# Patient Record
Sex: FEMALE | Race: WHITE | NOT HISPANIC OR LATINO | Employment: PART TIME | URBAN - METROPOLITAN AREA
[De-identification: names, ages, dates, MRNs, and addresses within clinical notes are randomized per-mention and may not be internally consistent; named-entity substitution may affect disease eponyms.]

---

## 2017-01-06 ENCOUNTER — HOSPITAL ENCOUNTER (EMERGENCY)
Facility: CLINIC | Age: 22
Discharge: HOME | End: 2017-01-07
Attending: EMERGENCY MEDICINE | Admitting: EMERGENCY MEDICINE
Payer: COMMERCIAL

## 2017-01-06 DIAGNOSIS — J11.1 INFLUENZA-LIKE ILLNESS: ICD-10-CM

## 2017-01-06 PROCEDURE — 87804 INFLUENZA ASSAY W/OPTIC: CPT | Performed by: EMERGENCY MEDICINE

## 2017-01-06 PROCEDURE — 99283 EMERGENCY DEPT VISIT LOW MDM: CPT | Mod: 25

## 2017-01-06 PROCEDURE — 99284 EMERGENCY DEPT VISIT MOD MDM: CPT | Performed by: EMERGENCY MEDICINE

## 2017-01-06 NOTE — ED AVS SNAPSHOT
[facility] Emergency Department    [address] [address]    Phone:  [phone]    Fax:  [phone]                                       Matteo Barth   MRN: [id number]    Department:  [facility] Emergency Department   Date of Visit:  1/6/2017           After Visit Summary Signature Page     I have received my discharge instructions, and my questions have been answered. I have discussed any challenges I see with this plan with the nurse or doctor.    ..........................................................................................................................................  Patient/Patient Representative Signature      ..........................................................................................................................................  Patient Representative Print Name and Relationship to Patient    ..................................................               ................................................  Date                                            Time    ..........................................................................................................................................  Reviewed by Signature/Title    ...................................................              ..............................................  Date                                                            Time

## 2017-01-06 NOTE — ED AVS SNAPSHOT
[facility] Emergency Department    [address] [address]    Phone:  [phone]    Fax:  [phone]                                       Matteo Barth   MRN: [id number]    Department:  [facility] Emergency Department   Date of Visit:  1/6/2017           Patient Information     Date Of Birth          1995        Your diagnoses for this visit were:     Influenza-like illness        You were seen by Maximus Moody MD.        Discharge Instructions       Drink plenty of fluids.  Use acetaminophen and ibuprofen for symptoms.  Return to the emergency department if worsening symptoms, repeated vomiting, or other concerns.  Otherwise follow up in primary care.    24 Hour Appointment Hotline       To make an appointment at any [facility] clinic, call [phone] ([phone]). If you don't have a family doctor or clinic, we will help you find one. [facility] clinics are conveniently located to serve the needs of you and your family.             Review of your medicines      CONTINUE these medicines which may have CHANGED, or have new prescriptions. If we are uncertain of the size of tablets/capsules you have at home, strength may be listed as something that might have changed.        Dose / Directions Last dose taken    albuterol 108 (90 BASE) MCG/ACT Inhaler   Commonly known as:  albuterol   Dose:  2 puff   What changed:    - when to take this  - reasons to take this   Quantity:  1 Inhaler        Inhale 2 puffs into the lungs every 4 hours as needed for shortness of breath / dyspnea   Refills:  0          Our records show that you are taking the medicines listed below. If these are incorrect, please call your family doctor or clinic.        Dose / Directions Last dose taken    triamcinolone 0.1 % cream   Commonly known as:  KENALOG   Quantity:  454 g        Apply to AA BID x 1-2 weeks then PRN   Refills:  1                Prescriptions were sent or printed at these locations (1  "Prescription)                   [facility] [address] [place], [place] [address] [address] [place] [address]    Telephone:  [phone]   Fax:  [phone]   Hours:                  E-Prescribed (1 of 1)         albuterol (ALBUTEROL) 108 (90 BASE) MCG/ACT Inhaler                Procedures and tests performed during your visit     Domingo avilezert DNA pcr    Influenza A/B antigen      Orders Needing Specimen Collection     None      Pending Results     No orders found for last 2 day(s).            Pending Culture Results     No orders found for last 2 day(s).       Test Results from your hospital stay           1/7/2017 12:46 AM - Interface, [facility] Results      Component Results     Component Value Ref Range & Units Status    Influenza A/B Agn Specimen   Corrected    Nasal  CORRECTED ON 01/07 AT 0046: PREVIOUSLY REPORTED AS 30      Influenza A  NEG Final    Negative   Test results must be correlated with clinical data. If necessary, results   should be confirmed by a molecular assay or viral culture.      Influenza B  NEG Final    Negative   Test results must be correlated with clinical data. If necessary, results   should be confirmed by a molecular assay or viral culture.                  Thank you for choosing [facility]       Thank you for choosing [facility] for your care. Our goal is always to provide you with excellent care. Hearing back from our patients is one way we can continue to improve our services. Please take a few minutes to complete the written survey that you may receive in the mail after you visit with us. Thank you!        [employer] Information     [employer] lets you send messages to your doctor, view your test results, renew your prescriptions, schedule appointments and more. To sign up, go to [email or web address] . Click on \"Log in\" on the left side of the screen, which will take you to the Welcome page. Then click on \"Sign up Now\" on the right side " of the page.     You will be asked to enter the access code listed below, as well as some personal information. Please follow the directions to create your username and password.     Your access code is: [email or web address]  Expires: 2017  1:00 AM     Your access code will  in 90 days. If you need help or a new code, please call your [facility] clinic or [phone].        Care EveryWhere ID     This is your Care EveryWhere ID. This could be used by other organizations to access your [facility] medical records  [id number]        After Visit Summary       This is your record. Keep this with you and show to your community pharmacist(s) and doctor(s) at your next visit.

## 2017-01-07 VITALS
WEIGHT: 260 LBS | TEMPERATURE: 98.1 F | OXYGEN SATURATION: 97 % | RESPIRATION RATE: 16 BRPM | DIASTOLIC BLOOD PRESSURE: 88 MMHG | SYSTOLIC BLOOD PRESSURE: 139 MMHG | BODY MASS INDEX: 46.07 KG/M2 | HEART RATE: 75 BPM | HEIGHT: 63 IN

## 2017-01-07 LAB
FLUAV+FLUBV AG SPEC QL: NORMAL
FLUAV+FLUBV AG SPEC QL: NORMAL
SPECIMEN SOURCE: NORMAL

## 2017-01-07 PROCEDURE — 94640 AIRWAY INHALATION TREATMENT: CPT

## 2017-01-07 PROCEDURE — 87801 DETECT AGNT MULT DNA AMPLI: CPT | Performed by: EMERGENCY MEDICINE

## 2017-01-07 PROCEDURE — 25000125 ZZHC RX 250: Performed by: EMERGENCY MEDICINE

## 2017-01-07 PROCEDURE — 25000132 ZZH RX MED GY IP 250 OP 250 PS 637: Performed by: EMERGENCY MEDICINE

## 2017-01-07 RX ORDER — ALBUTEROL SULFATE 90 UG/1
2 AEROSOL, METERED RESPIRATORY (INHALATION) EVERY 4 HOURS PRN
Qty: 1 INHALER | Refills: 0 | Status: SHIPPED | OUTPATIENT
Start: 2017-01-07 | End: 2020-05-26

## 2017-01-07 RX ORDER — ALBUTEROL SULFATE 90 UG/1
2 AEROSOL, METERED RESPIRATORY (INHALATION) ONCE
Status: COMPLETED | OUTPATIENT
Start: 2017-01-07 | End: 2017-01-07

## 2017-01-07 RX ORDER — IBUPROFEN 400 MG/1
800 TABLET, FILM COATED ORAL ONCE
Status: COMPLETED | OUTPATIENT
Start: 2017-01-07 | End: 2017-01-07

## 2017-01-07 RX ORDER — DEXAMETHASONE SODIUM PHOSPHATE 4 MG/ML
10 VIAL (ML) INJECTION ONCE
Status: COMPLETED | OUTPATIENT
Start: 2017-01-07 | End: 2017-01-07

## 2017-01-07 RX ORDER — IPRATROPIUM BROMIDE AND ALBUTEROL SULFATE 2.5; .5 MG/3ML; MG/3ML
3 SOLUTION RESPIRATORY (INHALATION) ONCE
Status: COMPLETED | OUTPATIENT
Start: 2017-01-07 | End: 2017-01-07

## 2017-01-07 RX ADMIN — IPRATROPIUM BROMIDE AND ALBUTEROL SULFATE 3 ML: .5; 3 SOLUTION RESPIRATORY (INHALATION) at 00:30

## 2017-01-07 RX ADMIN — IBUPROFEN 800 MG: 400 TABLET ORAL at 00:28

## 2017-01-07 RX ADMIN — ALBUTEROL SULFATE 2 PUFF: 90 AEROSOL, METERED RESPIRATORY (INHALATION) at 01:11

## 2017-01-07 RX ADMIN — DEXAMETHASONE SODIUM PHOSPHATE 10 MG: 4 INJECTION, SOLUTION INTRAMUSCULAR; INTRAVENOUS at 01:10

## 2017-01-07 NOTE — DISCHARGE INSTRUCTIONS
Drink plenty of fluids.  Use acetaminophen and ibuprofen for symptoms.  Return to the emergency department if worsening symptoms, repeated vomiting, or other concerns.  Otherwise follow up in primary care.

## 2017-01-07 NOTE — ED NOTES
Pt c/o 2 days of cough, tightness when coughing. Patient denies fever - states was feeling worse at work today.

## 2017-01-07 NOTE — ED PROVIDER NOTES
"  History     Chief Complaint   Patient presents with     Cough     Pt c/o tight cough     HPI  Matteo Barth is a 21 year old female who presents for cough, body aches, chills, runny nose, sore throat.  Symptoms have been ongoing for 2 days.  Cough is productive of clear sputum.  Pain in her chest with coughing, none otherwise, feels like tightness, discomfort rated as mild.  She does describe paroxysms of coughing.  She has been using NyQuil, DayQuil, and acetaminophen for symptoms with minimal improvement.  She has been told she has asthma in the past, has not required any albuterol for several years.  She denies fever, headache, abdominal pain, vomiting, diarrhea, dysuria, or rash.  No young children at home.    Previously healthy  No daily medications  No known drug allergies  Current every day smoker, rare alcohol use, denies illicit drug use    I have reviewed the Medications, Allergies, Past Medical and Surgical History, and Social History in the Epic system.    Review of Systems  A 4 point review of systems was performed. All pertinent positives and negatives were listed in the HPI and rest of ROS were otherwise negative.    Physical Exam   BP: 139/77 mmHg  Pulse: 75  Temp: 98.1  F (36.7  C)  Resp: 16  Height: 160 cm (5' 3\")  Weight: 117.935 kg (260 lb)  SpO2: 98 %  Physical Exam   Constitutional: She is oriented to person, place, and time. She appears well-developed and well-nourished. She appears distressed.   HENT:   Head: Normocephalic and atraumatic.   Right Ear: Tympanic membrane and external ear normal.   Left Ear: Tympanic membrane and external ear normal.   Nose: Nose normal.   Mouth/Throat: No trismus in the jaw. No oropharyngeal exudate, posterior oropharyngeal edema, posterior oropharyngeal erythema or tonsillar abscesses.   Eyes: Conjunctivae are normal. No scleral icterus.   Neck: Normal range of motion.   Cardiovascular: Normal rate and regular rhythm.    Pulmonary/Chest: Effort normal. No " stridor. No respiratory distress. She has wheezes.   Abdominal: Soft. She exhibits no distension.   Neurological: She is alert and oriented to person, place, and time.   Skin: Skin is warm and dry. She is not diaphoretic.   Psychiatric: She has a normal mood and affect. Her behavior is normal.   Nursing note and vitals reviewed.      ED Course   Procedures             Critical Care time:  none               Labs Ordered and Resulted from Time of ED Arrival Up to the Time of Departure from the ED   JUNIORTALLA PERTUSSIS CHARLENE IGG WITH REFLEX   INFLUENZA A/B ANTIGEN       Assessments & Plan (with Medical Decision Making)   21-year-old female presents for cough and chest tightness.  Differential includes asthma exacerbation, pneumonia, bronchitis, influenza, pertussis.  The patient has diffuse wheezing on examination.  Lungs otherwise clear, temperature 98.1 F, heart rate 75, SPO2 98% on room air, unlikely pneumonia, no indication for chest x-ray at this time.  Influenza swab negative.  She is given a DuoNeb with improvement in her symptoms.  Pertussis swab obtained, results pending.  She is given a dose of oral dexamethasone and a short course of albuterol here as well as a prescription for albuterol inhaler for symptoms.  She is discharged with instructions to return if she has worsening symptoms or other concerns, otherwise follow up in clinic.  She is instructed to avoid contact with young children and to try to limit contact outside the home as she is feeling ill or until after the pertussis swab returns negative.  She is in agreement with this plan.    I have reviewed the nursing notes.    I have reviewed the findings, diagnosis, plan and need for follow up with the patient.    New Prescriptions    ALBUTEROL (ALBUTEROL) 108 (90 BASE) MCG/ACT INHALER    Inhale 2 puffs into the lungs every 4 hours as needed for shortness of breath / dyspnea       Final diagnoses:   Influenza-like illness       1/6/2017   [place]  [facility] EMERGENCY DEPARTMENT      Maximus Moody MD  01/07/17 0105

## 2017-01-09 LAB
B PERT+PARAPERT DNA PNL SPEC NAA+PROBE: NORMAL
SPECIMEN SOURCE: NORMAL

## 2017-04-19 ENCOUNTER — HOSPITAL ENCOUNTER (EMERGENCY)
Facility: CLINIC | Age: 22
Discharge: HOME | End: 2017-04-19
Attending: PHYSICIAN ASSISTANT | Admitting: PHYSICIAN ASSISTANT
Payer: COMMERCIAL

## 2017-04-19 VITALS
HEART RATE: 106 BPM | DIASTOLIC BLOOD PRESSURE: 89 MMHG | TEMPERATURE: 97.5 F | SYSTOLIC BLOOD PRESSURE: 148 MMHG | RESPIRATION RATE: 20 BRPM | OXYGEN SATURATION: 100 %

## 2017-04-19 DIAGNOSIS — M54.50 ACUTE MIDLINE LOW BACK PAIN WITHOUT SCIATICA: Primary | ICD-10-CM

## 2017-04-19 PROCEDURE — 99213 OFFICE O/P EST LOW 20 MIN: CPT | Mod: 25

## 2017-04-19 PROCEDURE — 25000128 H RX IP 250 OP 636

## 2017-04-19 PROCEDURE — 96372 THER/PROPH/DIAG INJ SC/IM: CPT

## 2017-04-19 PROCEDURE — 99213 OFFICE O/P EST LOW 20 MIN: CPT | Performed by: PHYSICIAN ASSISTANT

## 2017-04-19 RX ORDER — KETOROLAC TROMETHAMINE 30 MG/ML
60 INJECTION, SOLUTION INTRAMUSCULAR; INTRAVENOUS ONCE
Status: COMPLETED | OUTPATIENT
Start: 2017-04-19 | End: 2017-04-19

## 2017-04-19 RX ORDER — KETOROLAC TROMETHAMINE 30 MG/ML
INJECTION, SOLUTION INTRAMUSCULAR; INTRAVENOUS
Status: COMPLETED
Start: 2017-04-19 | End: 2017-04-19

## 2017-04-19 RX ADMIN — KETOROLAC TROMETHAMINE 60 MG: 30 INJECTION, SOLUTION INTRAMUSCULAR; INTRAVENOUS at 16:04

## 2017-04-19 RX ADMIN — KETOROLAC TROMETHAMINE 60 MG: 30 INJECTION, SOLUTION INTRAMUSCULAR at 16:04

## 2017-04-19 ASSESSMENT — ENCOUNTER SYMPTOMS
CONSTITUTIONAL NEGATIVE: 1
GASTROINTESTINAL NEGATIVE: 1
BACK PAIN: 1
NEUROLOGICAL NEGATIVE: 1

## 2017-04-19 NOTE — ED AVS SNAPSHOT
[facility] Emergency Department    [address] [address]    Phone:  [phone]    Fax:  [phone]                                       Matteo Barth   MRN: [id number]    Department:  [facility] Emergency Department   Date of Visit:  4/19/2017           After Visit Summary Signature Page     I have received my discharge instructions, and my questions have been answered. I have discussed any challenges I see with this plan with the nurse or doctor.    ..........................................................................................................................................  Patient/Patient Representative Signature      ..........................................................................................................................................  Patient Representative Print Name and Relationship to Patient    ..................................................               ................................................  Date                                            Time    ..........................................................................................................................................  Reviewed by Signature/Title    ...................................................              ..............................................  Date                                                            Time

## 2017-04-19 NOTE — ED AVS SNAPSHOT
[facility] Emergency Department    [address] [facility] [address]    Phone:  [phone]    Fax:  [phone]                                       Matteo Barth   MRN: [id number]    Department:  [facility] Emergency Department   Date of Visit:  4/19/2017           Patient Information     Date Of Birth          1995        Your diagnoses for this visit were:     Acute midline low back pain without sciatica        You were seen by Roxanne Madrigal PA-C.      Follow-up Information     Follow up with [facility].    Specialty:  Physical Therapy    Why:  For follow-up    Contact information:    [address]  [address]  [facility] [address] [phone]    Additional information:    The medical center is located at   [address] ([address] in [place], four miles north   of [place]).        Follow up with [facility] Emergency Department.    Specialty:  EMERGENCY MEDICINE    Why:  As needed, For persistent symptoms    Contact information:    [address] [facility] [address] [facility] [address] [phone]    Additional information:    The medical center is located at   [address] (between [address]   [address], four miles north   of [place]).      Discharge References/Attachments     BACK PAIN, RELIEVING (ENGLISH)    BACK SPASM, NO TRAUMA (ENGLISH)      24 Hour Appointment Hotline       To make an appointment at any [facility], call [phone] ([phone]). If you don't have a family doctor or clinic, we will help you find one. [facility] clinics are conveniently located to serve the needs of you and your family.             Review of your medicines      Our records show that you are taking the medicines listed below. If these are incorrect, please call your family doctor or clinic.        Dose / Directions Last dose taken    albuterol 108 (90 BASE) MCG/ACT Inhaler   Commonly known as:   "albuterol   Dose:  2 puff   Quantity:  1 Inhaler        Inhale 2 puffs into the lungs every 4 hours as needed for shortness of breath / dyspnea   Refills:  0        triamcinolone 0.1 % cream   Commonly known as:  KENALOG   Quantity:  454 g        Apply to AA BID x 1-2 weeks then PRN   Refills:  1                Orders Needing Specimen Collection     None      Pending Results     No orders found from 2017 to 2017.            Pending Culture Results     No orders found from 2017 to 2017.            Test Results From Your Hospital Stay               Thank you for choosing [facility]       Thank you for choosing [facility] for your care. Our goal is always to provide you with excellent care. Hearing back from our patients is one way we can continue to improve our services. Please take a few minutes to complete the written survey that you may receive in the mail after you visit with us. Thank you!        [employer] Information     [employer] lets you send messages to your doctor, view your test results, renew your prescriptions, schedule appointments and more. To sign up, go to [email or web address] . Click on \"Log in\" on the left side of the screen, which will take you to the Welcome page. Then click on \"Sign up Now\" on the right side of the page.     You will be asked to enter the access code listed below, as well as some personal information. Please follow the directions to create your username and password.     Your access code is: [email or web address]  Expires: 2017  4:07 PM     Your access code will  in 90 days. If you need help or a new code, please call your [facility] clinic or [phone].        Care EveryWhere ID     This is your Care EveryWhere ID. This could be used by other organizations to access your [facility] medical records  [id number]        After Visit Summary       This is your record. Keep this with you and show to your community pharmacist(s) and doctor(s) at your next visit.  "

## 2017-04-19 NOTE — LETTER
[facility] EMERGENCY DEPARTMENT  [address] [address]  [phone]    2017    Matteo Barth  [address] [address]  [phone] (home)     : 1995      To Whom it may concern:    Matteo Barth was seen in our Emergency Department today, 2017.  Please excuse from work tonight.  Thank you.      Sincerely,        Roxanne Madrigal

## 2017-04-19 NOTE — ED PROVIDER NOTES
History     Chief Complaint   Patient presents with     Back Pain     HPI  Matteo Barth is a 22 year old female who presents with complaints of midline low back pain for the past 2 months.  Denies hx back pain.  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  States her pain is worse with bending, twisting, and moving.  Pt describes worsening pain when she gets off of work after being on her feet all day and bending-over.  Denies saddle anesthesia, bowel or bladder incontinence, lower extremity numbness/tingling/weakness.  Gait is steady.  Denies fevers, chills, nausea, vomiting, abdominal pain, urinary symptoms, or leg pain/swelling.      I have reviewed the Medications, Allergies, Past Medical and Surgical History, and Social History in the Epic system.    Review of Systems   Constitutional: Negative.    Gastrointestinal: Negative.    Genitourinary: Negative.    Musculoskeletal: Positive for back pain.   Skin: Negative.    Neurological: Negative.    All other systems reviewed and are negative.      Physical Exam   BP: 148/89  Pulse: 106  Temp: 97.5  F (36.4  C)  Resp: 20  SpO2: 100 %  Physical Exam   Constitutional: She appears well-developed and well-nourished. No distress.   HENT:   Head: Normocephalic and atraumatic.   Cardiovascular: Normal rate, regular rhythm and normal heart sounds.    Pulmonary/Chest: Effort normal and breath sounds normal.   Abdominal: Soft. There is no tenderness.   Musculoskeletal: Normal range of motion.        Cervical back: Normal. She exhibits normal range of motion, no tenderness and no bony tenderness.        Thoracic back: Normal. She exhibits normal range of motion, no tenderness and no bony tenderness.        Lumbar back: She exhibits tenderness. She exhibits normal range of motion and no bony tenderness.   There is diffuse lumbar midline and paraspinal  muscle tenderness to palpation.     Neurological: She is alert. She has normal strength. No sensory deficit.   Reflex Scores:       Patellar reflexes are 2+ on the right side and 2+ on the left side.  Able to raise-up on toes without difficulties   Skin: Skin is warm and dry.       ED Course     ED Course     Procedures      Assessments & Plan (with Medical Decision Making)     DDx:  acute muscle strain, muscle spasm, herniated disc, cauda equina, spinal fracture, spinal stenosis, sciatica, degenerative disease, ligamentous injury, spondylolisthesis, epidural abscess, osteomyelitis, AAA, abdominal etiologies, nephrolithiasis, pyelonephritis     Pt is a 22 year old female who presents with complaints of midline low back pain for the past 2 months.  Denies hx back pain.  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  States her pain is worse with bending, twisting, and moving.  Pt describes worsening pain when she gets off of work after being on her feet all day and bending-over.  Denies saddle anesthesia, bowel or bladder incontinence, lower extremity numbness/tingling/weakness.  Gait is steady.  Denies fevers, chills, nausea, vomiting, abdominal pain, urinary symptoms, or leg pain/swelling.  Pt is afebrile on arrival.  There is diffuse lumbar midline and paraspinal muscle tenderness to palpation.  No evidence of cauda equina.  Denies saddle anesthesia, bowel or bladder incontinence, lower extremity numbness/tingling/weakness.  Normal lower extremity strength.  Gait is steady.  No indication for emergent MRI imaging today as pt has no new trauma or neurologic symptoms or objective findings of weakness or signs of cauda equina on exam.  Encouraged symptomatic treatments including NSAIDs at home.  Hand-outs were provided.    Patient was instructed to follow-up with physical therapy for continued  care and management or sooner if new or worsening symptoms.  She is to return to the ED for persistent and/or worsening symptoms.  Patient expressed understanding of the diagnosis and plan and was discharged home in good condition.    I have reviewed the nursing notes.    I have reviewed the findings, diagnosis, plan and need for follow up with the patient.    Discharge Medication List as of 4/19/2017  4:07 PM          Final diagnoses:   Acute midline low back pain without sciatica       4/19/2017   [facility] EMERGENCY DEPARTMENT     Roxanne Madrigal PA-C  04/19/17 1902

## 2017-11-27 ENCOUNTER — OFFICE VISIT (OUTPATIENT)
Dept: OBGYN | Facility: CLINIC | Age: 22
End: 2017-11-27
Payer: COMMERCIAL

## 2017-11-27 VITALS
SYSTOLIC BLOOD PRESSURE: 145 MMHG | BODY MASS INDEX: 48.83 KG/M2 | DIASTOLIC BLOOD PRESSURE: 78 MMHG | HEART RATE: 98 BPM | WEIGHT: 275.6 LBS | HEIGHT: 63 IN

## 2017-11-27 DIAGNOSIS — Z30.011 ENCOUNTER FOR INITIAL PRESCRIPTION OF CONTRACEPTIVE PILLS: ICD-10-CM

## 2017-11-27 DIAGNOSIS — Z30.432 ENCOUNTER FOR IUD REMOVAL: Primary | ICD-10-CM

## 2017-11-27 DIAGNOSIS — Z23 NEED FOR PROPHYLACTIC VACCINATION AND INOCULATION AGAINST INFLUENZA: ICD-10-CM

## 2017-11-27 DIAGNOSIS — Z01.419 ENCOUNTER FOR GYNECOLOGICAL EXAMINATION WITHOUT ABNORMAL FINDING: ICD-10-CM

## 2017-11-27 PROCEDURE — 90686 IIV4 VACC NO PRSV 0.5 ML IM: CPT | Performed by: OBSTETRICS & GYNECOLOGY

## 2017-11-27 PROCEDURE — 99201 ZZC OFFICE/OUTPT VISIT, NEW, LEVEL I: CPT | Mod: 25 | Performed by: OBSTETRICS & GYNECOLOGY

## 2017-11-27 PROCEDURE — G0145 SCR C/V CYTO,THINLAYER,RESCR: HCPCS | Performed by: OBSTETRICS & GYNECOLOGY

## 2017-11-27 PROCEDURE — 90471 IMMUNIZATION ADMIN: CPT | Performed by: OBSTETRICS & GYNECOLOGY

## 2017-11-27 PROCEDURE — 87491 CHLMYD TRACH DNA AMP PROBE: CPT | Performed by: OBSTETRICS & GYNECOLOGY

## 2017-11-27 PROCEDURE — 87591 N.GONORRHOEAE DNA AMP PROB: CPT | Performed by: OBSTETRICS & GYNECOLOGY

## 2017-11-27 PROCEDURE — 58301 REMOVE INTRAUTERINE DEVICE: CPT | Performed by: OBSTETRICS & GYNECOLOGY

## 2017-11-27 RX ORDER — NORETHINDRONE ACETATE AND ETHINYL ESTRADIOL .03; 1.5 MG/1; MG/1
1 TABLET ORAL DAILY
Qty: 63 TABLET | Refills: 3 | Status: SHIPPED | OUTPATIENT
Start: 2017-11-27 | End: 2020-05-26

## 2017-11-27 NOTE — PROGRESS NOTES

## 2017-11-27 NOTE — MR AVS SNAPSHOT
"              After Visit Summary   11/27/2017    Matteo Barth    MRN: [id number]           Patient Information     Date Of Birth          1995        Visit Information        Provider Department      11/27/2017 11:00 AM Otto Jorge DO [facility]        Today's Diagnoses     Encounter for IUD removal    -  1    Encounter for gynecological examination without abnormal finding        Encounter for initial prescription of contraceptive pills           Follow-ups after your visit        Follow-up notes from your care team     Return in about 3 months (around 2/27/2018).      Who to contact     If you have questions or need follow up information about today's clinic visit or your schedule please contact [facility] directly at [phone].  Normal or non-critical lab and imaging results will be communicated to you by MyChart, letter or phone within 4 business days after the clinic has received the results. If you do not hear from us within 7 days, please contact the clinic through [employer] or phone. If you have a critical or abnormal lab result, we will notify you by phone as soon as possible.  Submit refill requests through [employer] or call your pharmacy and they will forward the refill request to us. Please allow 3 business days for your refill to be completed.          Additional Information About Your Visit        MyChart Information     [employer] lets you send messages to your doctor, view your test results, renew your prescriptions, schedule appointments and more. To sign up, go to [email or web address] . Click on \"Log in\" on the left side of the screen, which will take you to the Welcome page. Then click on \"Sign up Now\" on the right side of the page.     You will be asked to enter the access code listed below, as well as some personal information. Please follow the directions to create your username and password.     Your access code is: [email or web address]  Expires: 2/25/2018 " "11:21 AM     Your access code will  in 90 days. If you need help or a new code, please call your [facility] clinic or [phone].        Care EveryWhere ID     This is your Care EveryWhere ID. This could be used by other organizations to access your [facility] medical records  [id number]        Your Vitals Were     Pulse Height BMI (Body Mass Index)             98 5' 3\" (1.6 m) 48.82 kg/m2          Blood Pressure from Last 3 Encounters:   17 145/78   17 148/89   17 139/88    Weight from Last 3 Encounters:   17 275 lb 9.6 oz (125 kg)   17 260 lb (117.9 kg)   16 291 lb (132 kg)              We Performed the Following     Chlamydia trachomatis PCR     Neisseria gonorrhoeae PCR     Pap imaged thin layer screen only - recommended age 21 - 24 years     REMOVE INTRAUTERINE DEVICE          Today's Medication Changes          These changes are accurate as of: 17 11:21 AM.  If you have any questions, ask your nurse or doctor.               Start taking these medicines.        Dose/Directions    norethindrone-ethinyl estradiol 1.5-30 MG-MCG per tablet   Commonly known as:  MICROGESTIN 1.5/30   Used for:  Encounter for initial prescription of contraceptive pills   Started by:  Otto Jorge,         Dose:  1 tablet   Take 1 tablet by mouth daily   Quantity:  63 tablet   Refills:  3            Where to get your medicines      These medications were sent to [facility] [address] [address]     Phone:  [phone]     norethindrone-ethinyl estradiol 1.5-30 MG-MCG per tablet                Primary Care Provider Office Phone # Fax #    [facility] [phone] [address] [address]        Equal Access to Services     CHRIS ROBLES AH: Ramos jewell Somulu, waaxda luqadaha, qaybta kaalmada adeegyada, danny cheney " ah. So [facility] [phone].    ATENCIÓN: Si daniellela glenn, tiene a king disposición servicios gratuitos de asistencia lingüística. Angel al [phone].    We comply with applicable federal civil rights laws and [place] laws. We do not discriminate on the basis of race, color, national origin, age, disability, sex, sexual orientation, or gender identity.            Thank you!     Thank you for choosing [facility]  for your care. Our goal is always to provide you with excellent care. Hearing back from our patients is one way we can continue to improve our services. Please take a few minutes to complete the written survey that you may receive in the mail after your visit with us. Thank you!             Your Updated Medication List - Protect others around you: Learn how to safely use, store and throw away your medicines at www.disposemymeds.org.          This list is accurate as of: 11/27/17 11:21 AM.  Always use your most recent med list.                   Brand Name Dispense Instructions for use Diagnosis    albuterol 108 (90 BASE) MCG/ACT Inhaler    PROAIR HFA    1 Inhaler    Inhale 2 puffs into the lungs every 4 hours as needed for shortness of breath / dyspnea        norethindrone-ethinyl estradiol 1.5-30 MG-MCG per tablet    MICROGESTIN 1.5/30    63 tablet    Take 1 tablet by mouth daily    Encounter for initial prescription of contraceptive pills       triamcinolone 0.1 % cream    KENALOG    454 g    Apply to AA BID x 1-2 weeks then PRN    Acute dermatitis

## 2017-11-27 NOTE — PROGRESS NOTES
PROCEDURE: IUD Removal   A timeout to verify the patient, procedure and site was conducted immediately prior to the start of the procedure. This procedure has been fully reviewed with the patient and written informed consent has been obtained.   Any potential IUD concern of the patient was addressed. After deliberation, she has opted for removal.   Indication for removal: Due for removal   Alternate form of contraception planned: oral contraceptives   A graves speculum is placed within the vagina and the cervix is well visualized. The IUD strings were seen and then grasped with a ring forcep. The IUD was then removed from the uterus without difficulty and intact.  Patient will be prescribed a low-dose combination OCP. She is morbidly obese, but beyond that does not have a history of thromboembolism or other absolute contraindications. I did help her understand that all women are at increased risk for DVT/thromboembolism when using an estrogen-containing contraceptive. Obese women may be at increased risk beyond this. There may also be a question whether there is a slight decrease in efficacy with significantly increased BMI. The chance of thromboembolism in pregnancy is much higher than a morbidly obese woman on OCPs and therefore pregnancy prevention alone decreases the risk of DVT in her particular population. Given that many morbidly obese women are also intermittently anovulatory/amenorrheic, the OCP has the additional benefit of continuing to provide her with endometrial protection compared to being on nothing.  Despite this, patient feels that the best option for her at this time.  Patient is due for a Pap smear and is agreeable to STD testing. Pap smear is obtained as is GC/chlamydia culture.  Combination Oral Contraceptive Informed Consent:   The patient has been counseled on the risks/benefit profile of combination oral contraceptives. She understands that benefits may include menstrual cycle control,  decreased dysmenorrhea, inhibition of ovarian cysts, contraception and decreased lifetime risk of uterine and ovarian cancer.   Risks/side-effects may include (but are not limited to) mood changes, weight gain, libido changes and thromboembolic risks which may culminate in DVT, pulmonary embolism, MI, stroke, and retinal vein thrombosis. She is aware that irregular bleeding is common, and to be expected. After 4-6 months, many women attain amenorrhea. Transient nausea may occur. She is counseled that smoking increases thromboembolic risks, and that if she is 35 years of age or older and continues to smoke, she would need to discontinue the combination oral contraceptive pill and consider another method of contraception.   Use:   She is instructed to take her pill at the same time every day and use back-up contraception if she has been noncompliant. Regardless, condoms are recommended as a backup method for the first month, and ongoing use encouraged to protect against STDs. Annual exam documentation is required for contraceptive refills.   When to contact us:   She should contact the office if she develops migraines, hypertension, unilateral leg pain or swelling, SOB, diabetes or severe mood changes/suicidal ideation; or similarly if diagnosed with a new chronic medical condition. Regular reassessment is optimal in order to determine safe candidacy for ongoing use.   She is able to voice understanding of all of the above.  Her OCP prescription is sent to her pharmacy.  I recommend a blood pressure check in about 3 months, and a reassessment of efficacy and compliance.    Otto Jorge DO

## 2017-11-27 NOTE — NURSING NOTE
"Chief Complaint   Patient presents with     IUD     Removal       Initial /78 (BP Location: Right arm, Patient Position: Chair, Cuff Size: Adult Large)  Pulse 98  Ht 5' 3\" (1.6 m)  Wt 275 lb 9.6 oz (125 kg)  BMI 48.82 kg/m2 Estimated body mass index is 48.82 kg/(m^2) as calculated from the following:    Height as of this encounter: 5' 3\" (1.6 m).    Weight as of this encounter: 275 lb 9.6 oz (125 kg).  Medication Reconciliation: complete     Cayla Alas LPN        "

## 2017-11-27 NOTE — LETTER
December 3, 2017      Matteo Barth  [address] [address]    Dear ,      I am happy to inform you that your recent cervical cancer screening test (PAP smear) was normal.      Preventative screenings such as this help to ensure your health for years to come. You should repeat a pap smear in 3 years, unless otherwise directed.      You will still need to return to the clinic every year for your annual exam and other preventive tests.     Please contact the clinic at [phone] if you have further questions.       Sincerely,      Otto Jorge, /elana

## 2017-11-28 LAB
C TRACH DNA SPEC QL NAA+PROBE: NEGATIVE
N GONORRHOEA DNA SPEC QL NAA+PROBE: NEGATIVE
SPECIMEN SOURCE: NORMAL
SPECIMEN SOURCE: NORMAL

## 2017-11-29 LAB
COPATH REPORT: NORMAL
PAP: NORMAL

## 2019-03-31 ENCOUNTER — APPOINTMENT (OUTPATIENT)
Dept: GENERAL RADIOLOGY | Facility: CLINIC | Age: 24
End: 2019-03-31
Attending: PHYSICIAN ASSISTANT
Payer: COMMERCIAL

## 2019-03-31 ENCOUNTER — HOSPITAL ENCOUNTER (EMERGENCY)
Facility: CLINIC | Age: 24
Discharge: HOME | End: 2019-03-31
Attending: PHYSICIAN ASSISTANT | Admitting: PHYSICIAN ASSISTANT
Payer: COMMERCIAL

## 2019-03-31 VITALS
OXYGEN SATURATION: 99 % | TEMPERATURE: 98.8 F | SYSTOLIC BLOOD PRESSURE: 138 MMHG | WEIGHT: 240 LBS | BODY MASS INDEX: 42.52 KG/M2 | HEIGHT: 63 IN | HEART RATE: 61 BPM | DIASTOLIC BLOOD PRESSURE: 82 MMHG | RESPIRATION RATE: 18 BRPM

## 2019-03-31 DIAGNOSIS — S93.401A SPRAIN OF RIGHT ANKLE, UNSPECIFIED LIGAMENT, INITIAL ENCOUNTER: ICD-10-CM

## 2019-03-31 PROCEDURE — G0463 HOSPITAL OUTPT CLINIC VISIT: HCPCS | Mod: 25 | Performed by: PHYSICIAN ASSISTANT

## 2019-03-31 PROCEDURE — 99214 OFFICE O/P EST MOD 30 MIN: CPT | Mod: Z6 | Performed by: PHYSICIAN ASSISTANT

## 2019-03-31 PROCEDURE — 73610 X-RAY EXAM OF ANKLE: CPT | Mod: RT

## 2019-03-31 PROCEDURE — 29515 APPLICATION SHORT LEG SPLINT: CPT | Mod: RT | Performed by: PHYSICIAN ASSISTANT

## 2019-03-31 PROCEDURE — 73630 X-RAY EXAM OF FOOT: CPT | Mod: RT

## 2019-03-31 ASSESSMENT — MIFFLIN-ST. JEOR: SCORE: 1812.76

## 2019-03-31 NOTE — ED AVS SNAPSHOT
[facility]  [address] [address]  Phone:  [phone]  Fax:  [phone]                                    Matteo Barth   MRN: [id number]    Department:  [facility]   Date of Visit:  3/31/2019           After Visit Summary Signature Page    I have received my discharge instructions, and my questions have been answered. I have discussed any challenges I see with this plan with the nurse or doctor.    ..........................................................................................................................................  Patient/Patient Representative Signature      ..........................................................................................................................................  Patient Representative Print Name and Relationship to Patient    ..................................................               ................................................  Date                                   Time    ..........................................................................................................................................  Reviewed by Signature/Title    ...................................................              ..............................................  Date                                               Time          22EPIC Rev 08/18

## 2019-03-31 NOTE — LETTER
[facility] EMERGENCY DEPARTMENT  [address] [address]  Phone: [phone]  Fax: [phone]    March 31, 2019        Matteo Barth  [address] [address]          To whom it may concern:    RE: Matteo Felipe was evaluated in the  for a right ankle injury on 3/31/19.  I recommend she rest the right ankle for the next 24 hours.  After that she can have limited activity as tolerated by her symptoms for the next 5 days or until her next follow-up appointment.  During that time she may require performing some job duties sitting down or may require more frequent rest periods.      Please contact me for questions or concerns.      Sincerely,        Gayle Harley PA-C

## 2019-03-31 NOTE — ED PROVIDER NOTES
History     Chief Complaint   Patient presents with     Ankle Pain     right ankle   twisted it  5 days ago  worked yesterday  now is swollen twice as much and unable to walk on it      HPI  Matteo Barth is a 24 year old female who presents to the urgent care with concern over right ankle pain after injury approximately 5-6 days ago patient was walking on the steps and on the last step she twisted her ankle in the mud inverting it.  She had minimal pain at that time.  When she was walking in the house 2 days ago she again inverted the ankle and since then has had increasing pain, swelling.  She is unaware of any ecchymosis.  No distal numbness or paresthesias.  She has attempted to treat with Tylenol and ibuprofen.  However none-today.      Allergies:  No Known Allergies    Problem List:    Patient Active Problem List    Diagnosis Date Noted     Mild intermittent asthma without complication 09/29/2015     Priority: Medium     Diagnosis updated by automated process. Provider to review and confirm.       Photosensitivity due to sun 07/15/2014     Priority: Medium     06/2014:  Recurrent rash when skin not protected while outdoors in sunshine.       Generalized anxiety disorder 07/15/2014     Priority: Medium     05/2014: Recent admission to [facility]. Zoloft 50 mg.  Diagnosis updated by automated process. Provider to review and confirm.       Health Care Home 06/03/2014     Priority: Medium     *See Letters for [facility] Care Plan: Emergency Care Plan         Depression 05/28/2014     Priority: Medium     05/2014: Recent admission at [facility] for SI. Zoloft 50 mg.       IUD (intrauterine device) in place 01/17/2013     Priority: Medium     Mirena IUD placed 1/17/13  [id number] exp-6/15       Obesity 01/18/2007     Priority: Medium     Discussed dietary changes and limiting sedentary time.  RTC for [facility] soon.  05/29/07:Referred to [facility].  8/23/2007: Dr Malika Knox  at the [facility]. Urinary incontinence as a complication of obesity.  Diet modification.  UA/UC-normal.  Recheck in 6 wks and do full metabolic testing.  Saw dietician today.  10/25/2007: Dr Dorantes-Work w/psychologist Dr Eulalio Elder to work onweight issues.  ALT-21.  AST-46.  CRP-2.5.  Her GTT did not indicate insulin resistance-fasting insulin of 11.  A1C-5.3.  Cholesterol 159, Trig 98.   LDL 96 and HDL 44.   Problem list name updated by automated process. Provider to review       Attention deficit disorder 02/15/2006     Priority: Medium     January 18, 2007 - parent and teacher [facility] c/w concerns about paying attention, distraction, problems w/ organizing.  Grades Cs and Ds.  Do not want to increase dextroamphetamine dose given headaches.  Plan trial change to Concerta, start at 18 mg daily.  F/U in 2 months w/ [facility], earlier if problems.    05/29/07: Metadate CD 30 mg qd.  Problem list name updated by automated process. Provider to review        Past Medical History:    Past Medical History:   Diagnosis Date     Anxiety      Asthma Diagnosed during childhood     Attention deficit disorder with hyperactivity(314.01)      Depression      Obesity, unspecified      Past Surgical History:    Past Surgical History:   Procedure Laterality Date     LAPAROSCOPIC APPENDECTOMY  4/1/2013    Procedure: LAPAROSCOPIC APPENDECTOMY;  Laparoscopic appendectomy;  Surgeon: Robbin Puri MD;  Location: [facility] OR     SURGICAL HISTORY OF -   1995    T & A     Family History:    Family History   Problem Relation Age of Onset     Blood Disease Mother         hypoglycemic     Alcohol/Drug Mother      Depression Mother      Allergies Father         seasonal     Alcohol/Drug Father      Depression Maternal Grandmother      Alcohol/Drug Maternal Grandmother      Cancer Maternal Grandfather         lung cancer, skin cancer     Alcohol/Drug Maternal Grandfather      Respiratory Maternal Grandfather         copd     Respiratory  "Paternal Grandmother         emphysema     Depression Paternal Grandmother      Social History:  Marital Status:  Single [1]  Social History     Tobacco Use     Smoking status: Current Every Day Smoker     Packs/day: 1.00     Smokeless tobacco: Never Used     Tobacco comment: mom doesn't smoke around the kids   Substance Use Topics     Alcohol use: No     Drug use: Yes     Types: Marijuana      Medications:      albuterol (ALBUTEROL) 108 (90 BASE) MCG/ACT Inhaler   norethindrone-ethinyl estradiol (MICROGESTIN 1.5/30) 1.5-30 MG-MCG per tablet   triamcinolone (KENALOG) 0.1 % cream     Review of Systems  INTEGUMENTARY/SKIN: NEGATIVE for ecchymosis, lacerations, abrasions or rashes   MUSCULOSKELETAL: POSITIVE  for right foot and ankle pain and swelling and NEGATIVE for other concerning arthralgias or myalgias   NEURO: NEGATIVE for numbness, paresthesias   Physical Exam   BP: 138/82  Pulse: 61  Temp: 98.8  F (37.1  C)  Resp: 18  Height: 160 cm (5' 3\")  Weight: 108.9 kg (240 lb)  SpO2: 99 %  Physical Exam   Constitutional: She is oriented to person, place, and time. She appears well-developed and well-nourished. No distress.   Musculoskeletal:        Right ankle: She exhibits decreased range of motion (with inversion due to discomfort), swelling and ecchymosis. She exhibits no deformity, no laceration and normal pulse. Tenderness. Lateral malleolus tenderness found.        Right foot: There is tenderness, bony tenderness (base of fifth metatarsal) and swelling. There is normal range of motion, normal capillary refill, no crepitus, no deformity and no laceration.   Neurological: She is alert and oriented to person, place, and time. No sensory deficit.   Skin: Skin is warm, dry and intact. No abrasion, no ecchymosis and no rash noted.   Psychiatric: She has a normal mood and affect.     ED Course        Procedures        Critical Care time:  none        Results for orders placed or performed during the hospital encounter of " 03/31/19 (from the past 24 hour(s))   Foot  XR, G/E 3 views, right    Narrative    RIGHT FOOT THREE OR MORE VIEWS  3/31/2019 1:49 PM     HISTORY: Pain after inversion injury    COMPARISON: None.    FINDINGS: No fracture or dislocation is identified. Accessory ossicles  are present along the navicular and cuboid bone. Tiny Achilles and  plantar enthesophytes are present. Mild soft tissue swelling is  present along the ankle.      Impression    IMPRESSION: No acute osseous abnormality.     Medications - No data to display    Assessments & Plan (with Medical Decision Making)     I have reviewed the nursing notes.  I have reviewed the findings, diagnosis, plan and need for follow up with the patient.          Medication List      There are no discharge medications for this visit.       Final diagnoses:   Sprain of right ankle, unspecified ligament, initial encounter     24-year-old female presents to the urgent care with concern over right ankle pain after 2 inversion injuries over the last week.  As part of evaluation she did have x-ray of her right ankle which did not demonstrate any evidence of fracture, dislocation.  There was accessory ossicles along the navicular and cuboid bones and tiny Achilles and plantar and set of lites were present.  History and physical exam is most consistent with right ankle sprain. Differential would include contusion.  I do not suspect occult fracture.  Patient was placed in a gel splint and discharged home stable with instructions for symptomatic treatment with rest, ice, Tylenol/ibuprofen.  Follow-up with primary care provider if no improvement within the next 7 days.  Worrisome reasons to return to the ER/UC sooner discussed.      3/31/2019   [facility] EMERGENCY DEPARTMENT     Gayle Harley PA-C  03/31/19 2274

## 2019-05-11 ENCOUNTER — HOSPITAL ENCOUNTER (EMERGENCY)
Facility: CLINIC | Age: 24
Discharge: HOME | End: 2019-05-11
Attending: EMERGENCY MEDICINE | Admitting: EMERGENCY MEDICINE
Payer: COMMERCIAL

## 2019-05-11 VITALS
HEART RATE: 76 BPM | BODY MASS INDEX: 40.74 KG/M2 | SYSTOLIC BLOOD PRESSURE: 140 MMHG | RESPIRATION RATE: 16 BRPM | OXYGEN SATURATION: 99 % | TEMPERATURE: 97.9 F | WEIGHT: 230 LBS | DIASTOLIC BLOOD PRESSURE: 93 MMHG

## 2019-05-11 DIAGNOSIS — K08.89 PAIN, DENTAL: ICD-10-CM

## 2019-05-11 PROCEDURE — 64400 NJX AA&/STRD TRIGEMINAL NRV: CPT | Performed by: EMERGENCY MEDICINE

## 2019-05-11 PROCEDURE — 64400 NJX AA&/STRD TRIGEMINAL NRV: CPT | Mod: Z6 | Performed by: EMERGENCY MEDICINE

## 2019-05-11 PROCEDURE — 99283 EMERGENCY DEPT VISIT LOW MDM: CPT | Mod: 25 | Performed by: EMERGENCY MEDICINE

## 2019-05-11 PROCEDURE — 99284 EMERGENCY DEPT VISIT MOD MDM: CPT | Mod: 25 | Performed by: EMERGENCY MEDICINE

## 2019-05-11 PROCEDURE — 25000132 ZZH RX MED GY IP 250 OP 250 PS 637: Performed by: EMERGENCY MEDICINE

## 2019-05-11 RX ORDER — IBUPROFEN 200 MG
800 TABLET ORAL EVERY 8 HOURS PRN
Qty: 30 TABLET | Refills: 0 | COMMUNITY
Start: 2019-05-11 | End: 2019-05-16

## 2019-05-11 RX ORDER — IBUPROFEN 400 MG/1
400 TABLET, FILM COATED ORAL ONCE
Status: DISCONTINUED | OUTPATIENT
Start: 2019-05-11 | End: 2019-05-12 | Stop reason: HOSPADM

## 2019-05-11 RX ORDER — IBUPROFEN 400 MG/1
400 TABLET, FILM COATED ORAL ONCE
Status: COMPLETED | OUTPATIENT
Start: 2019-05-11 | End: 2019-05-11

## 2019-05-11 RX ORDER — ACETAMINOPHEN 500 MG
1000 TABLET ORAL EVERY 8 HOURS PRN
Qty: 100 TABLET | Refills: 0 | COMMUNITY
Start: 2019-05-11 | End: 2019-05-16

## 2019-05-11 RX ADMIN — IBUPROFEN 400 MG: 400 TABLET ORAL at 23:11

## 2019-05-11 ASSESSMENT — ENCOUNTER SYMPTOMS
FEVER: 0
APPETITE CHANGE: 0
NECK PAIN: 0
COUGH: 0
LIGHT-HEADEDNESS: 0
FACIAL SWELLING: 0
HEADACHES: 1
NECK STIFFNESS: 0
CHILLS: 0

## 2019-05-11 NOTE — ED AVS SNAPSHOT
[facility] Emergency Department  [address] [address]  Phone:  [phone]  Fax:  [phone]                                    Matteo Barth   MRN: [id number]    Department:  [facility] Emergency Department   Date of Visit:  5/11/2019           After Visit Summary Signature Page    I have received my discharge instructions, and my questions have been answered. I have discussed any challenges I see with this plan with the nurse or doctor.    ..........................................................................................................................................  Patient/Patient Representative Signature      ..........................................................................................................................................  Patient Representative Print Name and Relationship to Patient    ..................................................               ................................................  Date                                   Time    ..........................................................................................................................................  Reviewed by Signature/Title    ...................................................              ..............................................  Date                                               Time          22EPIC Rev 08/18

## 2019-05-12 NOTE — ED PROVIDER NOTES
History     Chief Complaint   Patient presents with     Dental Pain     top right, broken tooth now with pain     HPI  Matteo Barth is a 24 year old female with a history of poor dentition and dental caries presenting for evaluation of right upper and lower dental pain.  Patient reports she had some achy pain for the past several days but patient became severe today.  She reports throbbing and sharp pain in both the upper and lower posterior molars.  Denies fever chills.  Denies facial swelling.  Denies difficulty swallowing.  Denies swelling in the mouth or tongue.  Denies any new injury.  Took 400 mg ibuprofen about 3 hours before ED arrival without improvement in pain.     Allergies:  No Known Allergies    Problem List:    Patient Active Problem List    Diagnosis Date Noted     Mild intermittent asthma without complication 09/29/2015     Priority: Medium     Diagnosis updated by automated process. Provider to review and confirm.       Photosensitivity due to sun 07/15/2014     Priority: Medium     06/2014:  Recurrent rash when skin not protected while outdoors in sunshine.       Generalized anxiety disorder 07/15/2014     Priority: Medium     05/2014: Recent admission to [facility]. Zoloft 50 mg.  Diagnosis updated by automated process. Provider to review and confirm.       Health Care Home 06/03/2014     Priority: Medium     *See Letters for [facility] Care Plan: Emergency Care Plan         Depression 05/28/2014     Priority: Medium     05/2014: Recent admission at [facility] for SI. Zoloft 50 mg.       IUD (intrauterine device) in place 01/17/2013     Priority: Medium     Mirena IUD placed 1/17/13  [id number] exp-6/15       Obesity 01/18/2007     Priority: Medium     Discussed dietary changes and limiting sedentary time.  RTC for WCC soon.  05/29/07:Referred to [facility] weight management program.  8/23/2007: Dr Malika Knox at the [facility] Peds GI. Urinary incontinence as a complication of  obesity.  Diet modification.  UA/UC-normal.  Recheck in 6 wks and do full metabolic testing.  Saw dietician today.  10/25/2007: Dr Dorantes-Work w/psychologist Dr Eulalio Elder to work onweight issues.  ALT-21.  AST-46.  CRP-2.5.  Her GTT did not indicate insulin resistance-fasting insulin of 11.  A1C-5.3.  Cholesterol 159, Trig 98.   LDL 96 and HDL 44.   Problem list name updated by automated process. 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  Grades Cs and Ds.  Do not want to increase dextroamphetamine dose given headaches.  Plan trial change to Concerta, start at 18 mg daily.  F/U in 2 months w/ [facility], earlier if problems.    05/29/07: Metadate CD 30 mg qd.  Problem list name updated by automated process. Provider to review          Past Medical History:    Past Medical History:   Diagnosis Date     Anxiety      Asthma Diagnosed during childhood     Attention deficit disorder with hyperactivity(314.01)      Depression      Obesity, unspecified        Past Surgical History:    Past Surgical History:   Procedure Laterality Date     LAPAROSCOPIC APPENDECTOMY  4/1/2013    Procedure: LAPAROSCOPIC APPENDECTOMY;  Laparoscopic appendectomy;  Surgeon: Robbin Puri MD;  Location: [place] OR     SURGICAL HISTORY OF -   1996    T & A       Family History:    Family History   Problem Relation Age of Onset     Blood Disease Mother         hypoglycemic     Alcohol/Drug Mother      Depression Mother      Allergies Father         seasonal     Alcohol/Drug Father      Depression Maternal Grandmother      Alcohol/Drug Maternal Grandmother      Cancer Maternal Grandfather         lung cancer, skin cancer     Alcohol/Drug Maternal Grandfather      Respiratory Maternal Grandfather         copd     Respiratory Paternal Grandmother         emphysema     Depression  Paternal Grandmother        Social History:  Marital Status:  Single [1]  Social History     Tobacco Use     Smoking status: Current Every Day Smoker     Packs/day: 1.00     Smokeless tobacco: Never Used     Tobacco comment: mom doesn't smoke around the kids   Substance Use Topics     Alcohol use: No     Drug use: Yes     Types: Marijuana        Medications:      acetaminophen (TYLENOL) 500 MG tablet   ibuprofen (ADVIL/MOTRIN) 200 MG tablet   albuterol (ALBUTEROL) 108 (90 BASE) MCG/ACT Inhaler   norethindrone-ethinyl estradiol (MICROGESTIN 1.5/30) 1.5-30 MG-MCG per tablet   triamcinolone (KENALOG) 0.1 % cream         Review of Systems   Constitutional: Negative for appetite change, chills and fever.   HENT: Positive for dental problem. Negative for congestion and facial swelling.    Respiratory: Negative for cough.    Musculoskeletal: Negative for neck pain and neck stiffness.   Skin: Negative for rash.   Neurological: Positive for headaches. Negative for light-headedness.   All other systems reviewed and are negative.      Physical Exam   BP: (!) 140/93  Pulse: 81  Temp: 97.9  F (36.6  C)  Resp: 16  Weight: 104.3 kg (230 lb)  SpO2: 99 %      Physical Exam   Constitutional: She is oriented to person, place, and time. She appears well-developed and well-nourished.   Tearful, holding face, clearly uncomfortable   HENT:   Head: Normocephalic and atraumatic.   Mouth/Throat: Dental caries present.       No visible facial swelling or redness   Cardiovascular: Normal rate.   Pulmonary/Chest: Effort normal.   Neurological: She is alert and oriented to person, place, and time.   Skin: Skin is warm and dry. Capillary refill takes less than 2 seconds.   Psychiatric: She has a normal mood and affect.   Nursing note and vitals reviewed.      ED Course        Procedures                   No results found for this or any previous visit (from the past 24 hour(s)).    Medications   ibuprofen (ADVIL/MOTRIN) tablet 400 mg (has no  administration in time range)       10:00 PM: Performed a superior apical block of the right rear molar with bupivacaine.  1.8 mL was infused significant improvement in pain control.    10:43 PM; PT re-assessed. Upper pain controlled. Requests block of lower dental pain.     11:02 PM; Performed inferior alveolar dental block.  Patient had significant improvement in pain after this as well.    Assessments & Plan (with Medical Decision Making)  24-year-old female with history of poor dentition presenting for evaluation of right upper and lower dental pain.  On exam she has dental caries with an upper dental fracture.  No evidence of apical abscess.  Performed both superior and inferior dental block with complete resolution of her pain.  Advised need for close dental follow-up for definitive care.     I have reviewed the nursing notes.     I have reviewed the findings, diagnosis, plan and need for follow up with the patient.          Medication List      Started    acetaminophen 500 MG tablet  Commonly known as:  TYLENOL  1,000 mg, Oral, EVERY 8 HOURS PRN     ibuprofen 200 MG tablet  Commonly known as:  ADVIL/MOTRIN  800 mg, Oral, EVERY 8 HOURS PRN            Final diagnoses:   Pain, dental       5/11/2019   [facility] EMERGENCY DEPARTMENT     Chowdhury, Prateek Siegel MD  05/11/19 2016

## 2019-05-12 NOTE — DISCHARGE INSTRUCTIONS
Alternate acetaminophen with ibuprofen every 4 hours as needed for pain or fever (example: acetaminophen at 8am, ibuprofen at 12pm, acetaminophen at 4pm, ibuprofen at 8pm, etc).  I recommended keeping a note documenting which medication you gave and the time it was given. This will help you keep track of what medication to give next.  See discharge papers or medication label for dose.      Many of these clinics offer a sliding fee option for patients that qualify, and see patients on a walk-in or same day basis. Please call each clinic directly. As services, hours, fees and policies vary greatly.          [facility], [facility]  [phone]  Sees no insurance  [facility], [place]  [phone]  Preventive services only  [facility] (mult loc) [phone]  [facility]    ([facility]), [facility]  [phone]  [facility]       [phone]  Preventive services only  [facility]  [phone]  Accepts MA & sees no ins  [facility],      Accepts MA & sees no ins   [place]   [phone]; [phone]  [facility]   Accepts MA & sees no ins       [phone]  Dental [facility]  [phone]   Accepts MA emergencies  Emergency Dental [facility] [phone]  [facility],     Accepts Located within [facility]   [phone]    [facility] [phone]  Accepts MA & sees no ins   [facility]   Dental Clinic    [phone]  [facility], [facility]  [phone]   [facility] [phone]  [facility]  Preventive services only   [place]   [phone]  [facility]) [phone]  [facility] Dental, [place]  [phone]  Same day [facility] [phone]  Same day [facility],      Same day  apts   [place]   [phone]    [facility], [facility] [phone]  Free clinic, walk-in only  [facility] (multiple locations) [phone]      [facility] [phone]    [facility] [phone]  Free clinic, walk-in only  [facility]  [phone]  [facility] [phone] (adults)       [phone] (children)  [facility] [phone]    Also, referral service for low cost dental and healthcare: [phone]  And [phone]

## 2019-05-12 NOTE — ED NOTES
Pt broke her tooth about a week or 2 ago but states that she is out of work and doesn't have gas money to get to the dentist. She did find a ride here today and states that she is in more pain.

## 2019-05-24 ENCOUNTER — HOSPITAL ENCOUNTER (EMERGENCY)
Facility: CLINIC | Age: 24
Discharge: HOME | End: 2019-05-24
Attending: PHYSICIAN ASSISTANT | Admitting: PHYSICIAN ASSISTANT
Payer: COMMERCIAL

## 2019-05-24 VITALS
TEMPERATURE: 99 F | OXYGEN SATURATION: 96 % | BODY MASS INDEX: 42.51 KG/M2 | SYSTOLIC BLOOD PRESSURE: 166 MMHG | WEIGHT: 240 LBS | DIASTOLIC BLOOD PRESSURE: 100 MMHG | HEART RATE: 93 BPM | RESPIRATION RATE: 16 BRPM

## 2019-05-24 DIAGNOSIS — K08.89 PAIN, DENTAL: ICD-10-CM

## 2019-05-24 PROCEDURE — 64400 NJX AA&/STRD TRIGEMINAL NRV: CPT | Performed by: PHYSICIAN ASSISTANT

## 2019-05-24 PROCEDURE — 99214 OFFICE O/P EST MOD 30 MIN: CPT | Mod: 25 | Performed by: PHYSICIAN ASSISTANT

## 2019-05-24 PROCEDURE — 64400 NJX AA&/STRD TRIGEMINAL NRV: CPT | Mod: Z6 | Performed by: PHYSICIAN ASSISTANT

## 2019-05-24 PROCEDURE — G0463 HOSPITAL OUTPT CLINIC VISIT: HCPCS | Mod: 25 | Performed by: PHYSICIAN ASSISTANT

## 2019-05-24 RX ORDER — HYDROCODONE BITARTRATE AND ACETAMINOPHEN 5; 325 MG/1; MG/1
1-2 TABLET ORAL EVERY 6 HOURS PRN
Qty: 10 TABLET | Refills: 0 | Status: SHIPPED | OUTPATIENT
Start: 2019-05-24 | End: 2019-05-27

## 2019-05-24 RX ORDER — PENICILLIN V POTASSIUM 500 MG/1
500 TABLET, FILM COATED ORAL 4 TIMES DAILY
Qty: 28 TABLET | Refills: 0 | Status: SHIPPED | OUTPATIENT
Start: 2019-05-24 | End: 2019-05-31

## 2019-05-24 NOTE — ED AVS SNAPSHOT
[facility] Emergency Department  [address] [address]  Phone:  [phone]  Fax:  [phone]                                    Matteo Barth   MRN: [id number]    Department:  [facility] Emergency Department   Date of Visit:  5/24/2019           After Visit Summary Signature Page    I have received my discharge instructions, and my questions have been answered. I have discussed any challenges I see with this plan with the nurse or doctor.    ..........................................................................................................................................  Patient/Patient Representative Signature      ..........................................................................................................................................  Patient Representative Print Name and Relationship to Patient    ..................................................               ................................................  Date                                   Time    ..........................................................................................................................................  Reviewed by Signature/Title    ...................................................              ..............................................  Date                                               Time          22EPIC Rev 08/18

## 2019-05-25 NOTE — ED PROVIDER NOTES
History     Chief Complaint   Patient presents with     Dental Pain     R side dental pain     HPI  Matteo Barth is a 24 year old female who presents to the urgent care with concern over right-sided upper and lower dental pain which been present for at least last 2 weeks.  Patient reports that she was evaluated in the emergency department earlier was diagnosed with caries.  She did schedule follow-up appointment with a dentist however closest available appointment was not until next week.  She states that earlier today while she was eating she bit into something and believe that she fractured her tooth further.  She has noted some swelling and states that pain is so severe it causes her to feel short of breath as air directly over the site also increases her pain.  She denies any fever, chills, myalgias, cough, wheezing, nausea, vomiting, diarrhea, or abdominal pain      Allergies:  No Known Allergies    Problem List:    Patient Active Problem List    Diagnosis Date Noted     Mild intermittent asthma without complication 09/29/2015     Priority: Medium     Diagnosis updated by automated process. Provider to review and confirm.       Photosensitivity due to sun 07/15/2014     Priority: Medium     06/2014:  Recurrent rash when skin not protected while outdoors in sunshine.       Generalized anxiety disorder 07/15/2014     Priority: Medium     05/2014: Recent admission to [facility]. Zoloft 50 mg.  Diagnosis updated by automated process. Provider to review and confirm.       Health Care Home 06/03/2014     Priority: Medium     *See Letters for [facility] Care Plan: Emergency Care Plan         Depression 05/28/2014     Priority: Medium     05/2014: Recent admission at [facility] for SI. Zoloft 50 mg.       IUD (intrauterine device) in place 01/17/2013     Priority: Medium     Mirena IUD placed 1/17/13  [id number] exp-6/15       Obesity 01/18/2007     Priority: Medium     Discussed dietary changes and limiting  sedentary time.  RTC for WCC soon.  05/29/07:Referred to [facility] weight management program.  8/23/2007: Dr Malika Knox at the [facility] Peds GI. Urinary incontinence as a complication of obesity.  Diet modification.  UA/UC-normal.  Recheck in 6 wks and do full metabolic testing.  Saw dietician today.  10/25/2007: Dr Dorantes-Work w/psychologist Dr Eulalio Elder to work onweight issues.  ALT-21.  AST-46.  CRP-2.5.  Her GTT did not indicate insulin resistance-fasting insulin of 11.  A1C-5.3.  Cholesterol 159, Trig 98.   LDL 96 and HDL 44.   Problem list name updated by automated process. 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  Grades Cs and Ds.  Do not want to increase dextroamphetamine dose given headaches.  Plan trial change to Concerta, start at 18 mg daily.  F/U in 2 months w/ [facility], earlier if problems.    05/29/07: Metadate CD 30 mg qd.  Problem list name updated by automated process. Provider to review          Past Medical History:    Past Medical History:   Diagnosis Date     Anxiety      Asthma Diagnosed during childhood     Attention deficit disorder with hyperactivity(314.01)      Depression      Obesity, unspecified        Past Surgical History:    Past Surgical History:   Procedure Laterality Date     LAPAROSCOPIC APPENDECTOMY  4/1/2013    Procedure: LAPAROSCOPIC APPENDECTOMY;  Laparoscopic appendectomy;  Surgeon: Robbin Puri MD;  Location: [facility] OR     SURGICAL HISTORY OF -   1996    T & A     Family History:    Family History   Problem Relation Age of Onset     Blood Disease Mother         hypoglycemic     Alcohol/Drug Mother      Depression Mother      Allergies Father         seasonal     Alcohol/Drug Father      Depression Maternal Grandmother      Alcohol/Drug Maternal Grandmother      Cancer Maternal Grandfather          lung cancer, skin cancer     Alcohol/Drug Maternal Grandfather      Respiratory Maternal Grandfather         copd     Respiratory Paternal Grandmother         emphysema     Depression Paternal Grandmother        Social History:  Marital Status:  Single [1]  Social History     Tobacco Use     Smoking status: Current Every Day Smoker     Packs/day: 1.00     Smokeless tobacco: Never Used     Tobacco comment: mom doesn't smoke around the kids   Substance Use Topics     Alcohol use: No     Drug use: Yes     Types: Marijuana        Medications:      HYDROcodone-acetaminophen (NORCO) 5-325 MG tablet   penicillin V (VEETID) 500 MG tablet   albuterol (ALBUTEROL) 108 (90 BASE) MCG/ACT Inhaler   norethindrone-ethinyl estradiol (MICROGESTIN 1.5/30) 1.5-30 MG-MCG per tablet   triamcinolone (KENALOG) 0.1 % cream     Review of Systems  CONSTITUTIONAL:NEGATIVE for fever, chills, change in weight  INTEGUMENTARY/SKIN: NEGATIVE for worrisome rashes, moles or lesions  EYES: NEGATIVE for vision changes or irritation  ENT/MOUTH: POSITIVE for right sided dental pain, right ear pain and NEGATIVE for nasal congestion, sore throat   RESP:POSITIVE for shortness of breath and NEGATIVE for wheezing, cough   GI: NEGATIVE for abdominal pain, diarrhea, nausea and vomiting  Physical Exam   BP: (!) 166/100  Pulse: 93  Temp: 99  F (37.2  C)  Resp: 16  Weight: 108.9 kg (240 lb)  SpO2: 96 %  Physical Exam  GENERAL APPEARANCE: healthy, alert and no distress  EYES: EOMI,  PERRL, conjunctiva clear  HENT: ear canals and TM's normal.  There are large areas of caries/decay on teeth #2, 31 which are both acutely tender to palpation, no clear visible erythema, swelling  NECK: supple, nontender, no lymphadenopathy  RESP: lungs clear to auscultation - no rales, rhonchi or wheezes  CV: regular rates and rhythm, normal S1 S2, no murmur noted  SKIN: no suspicious lesions or rashes  ED Course        Procedures        Critical Care time:  none       44-year-old  female the urgent care with concern over right-sided dental pain which has been present for  Periapical block.  #2 was performed with 1 cc of 0.5% bupivacaine  Inferior alveolar dental block as performed with 2 ml of 0.5% bupivacaine     No results found for this or any previous visit (from the past 24 hour(s)).  Medications - No data to display  Assessments & Plan (with Medical Decision Making)     I have reviewed the nursing notes.    I have reviewed the findings, diagnosis, plan and need for follow up with the patient.          Medication List      Started    HYDROcodone-acetaminophen 5-325 MG tablet  Commonly known as:  NORCO  1-2 tablets, Oral, EVERY 6 HOURS PRN     penicillin V 500 MG tablet  Commonly known as:  VEETID  500 mg, Oral, 4 TIMES DAILY          Final diagnoses:   Pain, dental     Last few weeks, worse over the last 24 hours.  She had  significantly elevated blood pressure upon arrival which I think is at least partially due to pain response.  Physical exam findings as described above are consistent with caries/decay, given the dramatic worsening of her symptoms would also consider pulpitis.  No clear abscess that was amenable to drainage at this time. She tolerated nerve block for pain control and had moderate improvement of her pain.  She w prescription for penicillin, as discharged home stable with [place].  Follow-up with dentist as directed.  Worrisome reasons to return to the ER/UC or seek medical care sooner discussed.    Disclaimer: This note consists of symbols derived from keyboarding, dictation, and/or voice recognition software. As a result, there may be errors in the script that have gone undetected.  Please consider this when interpreting information found in the chart.      5/24/2019   [facility] EMERGENCY DEPARTMENT     Gayle Harley PA-C  05/26/19 1501

## 2019-05-25 NOTE — DISCHARGE INSTRUCTIONS
Many of these clinics offer a sliding fee option for patients that qualify, and see patients on a walk-in or same day basis. Please call each clinic directly. As services, hours, fees and policies vary greatly.          [facility], [facility]  [phone]  Sees no insurance  [facility], [place]  [phone]  Preventive services only  [facility] (mult loc) [phone]  [facility]    ([facility]), [facility]  [phone]  [facility], [place]       [phone]  Preventive services only  [facility]  [phone]  Accepts MA & sees no ins  [facility],      Accepts MA & sees no ins   [place]   [phone]; [phone]  [facility], [facility]   Accepts MA & sees no ins       [phone]  [facility], [facility]  [phone]   Accepts MA emergencies  [facility], [place] [phone]  [facility],     Accepts MA   [place]   [phone]    [facility] [phone]  Accepts MA & sees no ins   [facility]   Dental Clinic    [phone]  [facility], [facility]  [phone]   [facility] [phone]  [facility]  Preventive services only   [place]   [phone]  [facility] [phone]  [facility] Dental, [place]  [phone]  Same day [facility] [phone]  Same day [facility],      Same day [facility]   [phone]    [facility], [facility] [phone]  Free [facility], walk-in only  [facility] (multiple locations) [phone]      Critical access hospital Dental , [facility] [phone]    [facility] [phone]  Free clinic, walk-in only  [place] [facility]  [phone]  [facility] [phone] (adults)       [phone]  (children)  [facility] [phone]    Also, referral service for low cost dental and healthcare: [phone]  And [phone]

## 2020-03-08 ENCOUNTER — HOSPITAL ENCOUNTER (EMERGENCY)
Facility: CLINIC | Age: 25
Discharge: HOME | End: 2020-03-08
Attending: FAMILY MEDICINE | Admitting: FAMILY MEDICINE
Payer: OTHER MISCELLANEOUS

## 2020-03-08 VITALS
TEMPERATURE: 98.7 F | RESPIRATION RATE: 18 BRPM | OXYGEN SATURATION: 97 % | DIASTOLIC BLOOD PRESSURE: 80 MMHG | SYSTOLIC BLOOD PRESSURE: 148 MMHG | HEART RATE: 82 BPM

## 2020-03-08 DIAGNOSIS — S61.411A LACERATION OF RIGHT HAND WITHOUT FOREIGN BODY, INITIAL ENCOUNTER: ICD-10-CM

## 2020-03-08 PROCEDURE — 90471 IMMUNIZATION ADMIN: CPT

## 2020-03-08 PROCEDURE — 99284 EMERGENCY DEPT VISIT MOD MDM: CPT | Mod: 25

## 2020-03-08 PROCEDURE — 99284 EMERGENCY DEPT VISIT MOD MDM: CPT | Mod: 25 | Performed by: FAMILY MEDICINE

## 2020-03-08 PROCEDURE — 12001 RPR S/N/AX/GEN/TRNK 2.5CM/<: CPT

## 2020-03-08 PROCEDURE — 25000128 H RX IP 250 OP 636: Performed by: FAMILY MEDICINE

## 2020-03-08 PROCEDURE — 12001 RPR S/N/AX/GEN/TRNK 2.5CM/<: CPT | Mod: Z6 | Performed by: FAMILY MEDICINE

## 2020-03-08 PROCEDURE — 90715 TDAP VACCINE 7 YRS/> IM: CPT | Performed by: FAMILY MEDICINE

## 2020-03-08 RX ADMIN — CLOSTRIDIUM TETANI TOXOID ANTIGEN (FORMALDEHYDE INACTIVATED), CORYNEBACTERIUM DIPHTHERIAE TOXOID ANTIGEN (FORMALDEHYDE INACTIVATED), BORDETELLA PERTUSSIS TOXOID ANTIGEN (GLUTARALDEHYDE INACTIVATED), BORDETELLA PERTUSSIS FILAMENTOUS HEMAGGLUTININ ANTIGEN (FORMALDEHYDE INACTIVATED), BORDETELLA PERTUSSIS PERTACTIN ANTIGEN, AND BORDETELLA PERTUSSIS FIMBRIAE 2/3 ANTIGEN 0.5 ML: 5; 2; 2.5; 5; 3; 5 INJECTION, SUSPENSION INTRAMUSCULAR at 07:54

## 2020-03-08 NOTE — LETTER
[facility] EMERGENCY DEPARTMENT  [address] [address]  Phone: [phone]  Fax: [phone]      REPORT OF WORK ABILITY    NOTE TO EMPLOYEE: You must promptly provide a copy of this report to your  employer or worker's compensation insurer, and Qualified Rehabilitation Consultant.    Date: 3/8/2020                     Employee Name: Matteo Barth         YOB: 1995  Medical Record Number: [id number]   Soc.Sec.No: [id number]  Employer: [employer] Date of Injury: March 8, 2020  Managed Care Organization / Insurance Company Name: UNKNOWN    Diagnosis:     ICD-10-CM    1. Laceration of right hand without foreign body, initial encounter  S61.411A     keep clean and dry and return immed for signs infection . sutures out in 10 days. avoid overuse of the palm.  see hand surgery for weakness or numbness thumb       Work Related: yes     MMI: NO   Permanent Partial Disability(PPD) likely: UNKNOWN    EMPLOYEE IS ABLE TO WORK: OFF work for remainder of shift and with restrictions from March 9, 2020  to March 19, 2020  -  Full shift     RESTRICTIONS IF ANY:     Hand/Wrist:  right Avoid gripping/grasping - keep clean and dry.  glove hand as needed    OTHER RESTRICTIONS: None    TREATMENT PLAN/NOTES: limited firm grasping on on right.      Randell Borrego MD

## 2020-03-08 NOTE — DISCHARGE INSTRUCTIONS
ICD-10-CM    1. Laceration of right hand without foreign body, initial encounter  S61.411A     keep clean and dry and return immed for signs infection . sutures out in 10 days. avoid overuse of the palm.  see hand surgery for weakness or numbness thumb

## 2020-03-08 NOTE — ED NOTES
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 Laceration is full thickness and bleeding is controlled. Last tetanus was in 2008 so pt received an updated TDAP. Provider in room infusing with lidocaine and tech irrigating wound in preparation for sutures.

## 2020-03-08 NOTE — ED PROVIDER NOTES
History     Chief Complaint   Patient presents with     Laceration     lac to palm of right hand from       HPI  Mattoe Barth is a 25 year old female who presents with a laceration that occurred at work in her nondominant right hand at the palmar aspect thenar eminence region with an initial sense of numbness in this region but no longer feels this numbness.  No obvious weakness.  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  She had been on shift from 3 AM to the time of her injury.  Her last tetanus was in 2008.  She sustained no other injuries.  The blade had been a new blade placed in the cutter this morning.        Allergies:  No Known Allergies    Problem List:    Patient Active Problem List    Diagnosis Date Noted     Mild intermittent asthma without complication 09/29/2015     Priority: Medium     Diagnosis updated by automated process. Provider to review and confirm.       Photosensitivity due to sun 07/15/2014     Priority: Medium     06/2014:  Recurrent rash when skin not protected while outdoors in sunshine.       Generalized anxiety disorder 07/15/2014     Priority: Medium     05/2014: Recent admission to [facility]. Zoloft 50 mg.  Diagnosis updated by automated process. Provider to review and confirm.       Health Care Home 06/03/2014     Priority: Medium     *See Letters for [facility] Care Plan: Emergency Care Plan         Depression 05/28/2014     Priority: Medium     05/2014: Recent admission at [facility] for SI. Zoloft 50 mg.       IUD (intrauterine device) in place 01/17/2013     Priority: Medium     Mirena IUD placed 1/17/13  [id number] exp-6/15       Obesity 01/18/2007     Priority: Medium     Discussed dietary changes and limiting sedentary time.  RTC for [facility] soon.  05/29/07:Referred to [facility] weight management program.  8/23/2007: Dr Malika Knox at the [facility]. Urinary incontinence as a complication of  obesity.  Diet modification.  UA/UC-normal.  Recheck in 6 wks and do full metabolic testing.  Saw dietician today.  10/25/2007: Dr Dorantes-Work w/psychologist Dr Eulalio Elder to work onweight issues.  ALT-21.  AST-46.  CRP-2.5.  Her GTT did not indicate insulin resistance-fasting insulin of 11.  A1C-5.3.  Cholesterol 159, Trig 98.   LDL 96 and HDL 44.   Problem list name updated by automated process. 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  Grades Cs and Ds.  Do not want to increase dextroamphetamine dose given headaches.  Plan trial change to Concerta, start at 18 mg daily.  F/U in 2 months w/ [facility], earlier if problems.    05/29/07: Metadate CD 30 mg qd.  Problem list name updated by automated process. Provider to review          Past Medical History:    Past Medical History:   Diagnosis Date     Anxiety      Asthma Diagnosed during childhood     Attention deficit disorder with hyperactivity(314.01)      Depression      Obesity, unspecified        Past Surgical History:    Past Surgical History:   Procedure Laterality Date     LAPAROSCOPIC APPENDECTOMY  4/1/2013    Procedure: LAPAROSCOPIC APPENDECTOMY;  Laparoscopic appendectomy;  Surgeon: Robbin Puri MD;  Location: [facility] OR     SURGICAL HISTORY OF -   1996    T & A       Family History:    Family History   Problem Relation Age of Onset     Blood Disease Mother         hypoglycemic     Alcohol/Drug Mother      Depression Mother      Allergies Father         seasonal     Alcohol/Drug Father      Depression Maternal Grandmother      Alcohol/Drug Maternal Grandmother      Cancer Maternal Grandfather         lung cancer, skin cancer     Alcohol/Drug Maternal Grandfather      Respiratory Maternal Grandfather         copd     Respiratory Paternal Grandmother         emphysema     Depression  Paternal Grandmother        Social History:  Marital Status:  Single [1]  Social History     Tobacco Use     Smoking status: Current Every Day Smoker     Packs/day: 1.00     Smokeless tobacco: Never Used     Tobacco comment: mom doesn't smoke around the kids   Substance Use Topics     Alcohol use: No     Drug use: Yes     Types: Marijuana        Medications:    albuterol (ALBUTEROL) 108 (90 BASE) MCG/ACT Inhaler  norethindrone-ethinyl estradiol (MICROGESTIN 1.5/30) 1.5-30 MG-MCG per tablet  triamcinolone (KENALOG) 0.1 % cream          Review of Systems    ROS:  5 point ROS negative except as noted above in HPI, including Gen., Resp., CV, GI &  system review.    Physical Exam   BP: (!) 148/80  Pulse: 82  Temp: 98.7  F (37.1  C)  Resp: 18  SpO2: 97 %      Physical Exam           There is a 2 cm linear laceration oriented across the thenar eminence.  There is no bleeding until I start to numb the area.  She has intact median ulnar and radial nerve function including opposition against resistance flexion and extension of her thumb that specifically tested.  In addition her DIP PIP extension flexion tested for each digit is normal.  The depth appears to be several millimeters deep but initially only muscle body is seen on exploration of the wound.  She has normal distal capillary refill.  There is no pulsatile bleeding.        ED Course        [facility]    -Laceration Repair    Date/Time: 3/8/2020 8:02 AM  Performed by: Randell Borrego MD  Authorized by: Randell Borrego MD       ANESTHESIA (see MAR for exact dosages):     Anesthesia method:  Local infiltration    Local anesthetic:  Lidocaine 1% WITH epi  LACERATION DETAILS     Location:  Hand    Length (cm):  2    Depth (mm):  3    REPAIR TYPE:     Repair type:  Simple      EXPLORATION:     Contaminated: no      TREATMENT:     Area cleansed with:  Saline    Amount of cleaning:  Standard    Irrigation solution:  Sterile saline    Irrigation  volume:  500    Irrigation method:  Syringe    SKIN REPAIR     Repair method:  Sutures    Suture size:  4-0    Suture material:  Nylon    Number of sutures:  6    APPROXIMATION     Approximation:  Close    POST-PROCEDURE DETAILS     Dressing:  Antibiotic ointment and non-adherent dressing      PROCEDURE   Patient Tolerance:  Patient tolerated the procedure well with no immediate complications                     Critical Care time:  none               No results found for this or any previous visit (from the past 24 hour(s)).    Medications   Tdap (tetanus-diphtheria-acell pertussis) (ADACEL) injection 0.5 mL (0.5 mLs Intramuscular Given 3/8/20 0754)       Assessments & Plan (with Medical Decision Making)     MDM: Matteo Barth is a 25 year old female who presented with a work-related injury that occurred this morning.  She lacerated the superficial aspect of the thenar eminence without obvious numbness weakness at this time.  There is a risk to radial nerve injury in this region but I see no defect at this time.  I did recommend following up with hand surgery if she has any persistent sensory deficit or weakness in the thumb.  Otherwise sutures out 10 days.  Precautions given for return.  Note for work.  I have reviewed the nursing notes.    I have reviewed the findings, diagnosis, plan and need for follow up with the patient.       New Prescriptions    No medications on file       Final diagnoses:   Laceration of right hand without foreign body, initial encounter - keep clean and dry and return immed for signs infection . sutures out in 10 days. avoid overuse of the palm.  see hand surgery for weakness or numbness thumb       3/8/2020   [facility] EMERGENCY DEPARTMENT     Randell Borrego MD  03/08/20 0872

## 2020-03-08 NOTE — ED AVS SNAPSHOT
[facility] Emergency Department  [address] [address]  Phone:  [phone]  Fax:  [phone]                                    Matteo Barth   MRN: [id number]    Department:  [facility] Emergency Department   Date of Visit:  3/8/2020           After Visit Summary Signature Page    I have received my discharge instructions, and my questions have been answered. I have discussed any challenges I see with this plan with the nurse or doctor.    ..........................................................................................................................................  Patient/Patient Representative Signature      ..........................................................................................................................................  Patient Representative Print Name and Relationship to Patient    ..................................................               ................................................  Date                                   Time    ..........................................................................................................................................  Reviewed by Signature/Title    ...................................................              ..............................................  Date                                               Time          22EPIC Rev 08/18

## 2020-05-26 ENCOUNTER — VIRTUAL VISIT (OUTPATIENT)
Dept: FAMILY MEDICINE | Facility: CLINIC | Age: 25
End: 2020-05-26
Payer: COMMERCIAL

## 2020-05-26 DIAGNOSIS — J45.20 MILD INTERMITTENT ASTHMA WITHOUT COMPLICATION: Primary | Chronic | ICD-10-CM

## 2020-05-26 DIAGNOSIS — L73.2 HIDRADENITIS SUPPURATIVA: ICD-10-CM

## 2020-05-26 PROCEDURE — 99203 OFFICE O/P NEW LOW 30 MIN: CPT | Mod: GT | Performed by: NURSE PRACTITIONER

## 2020-05-26 RX ORDER — ALBUTEROL SULFATE 90 UG/1
2 AEROSOL, METERED RESPIRATORY (INHALATION) EVERY 4 HOURS PRN
Qty: 1 INHALER | Refills: 3 | Status: SHIPPED | OUTPATIENT
Start: 2020-05-26

## 2020-05-26 RX ORDER — ALBUTEROL SULFATE 90 UG/1
2 AEROSOL, METERED RESPIRATORY (INHALATION) EVERY 4 HOURS PRN
Qty: 1 INHALER | Refills: 0 | Status: SHIPPED | OUTPATIENT
Start: 2020-05-26 | End: 2020-05-26

## 2020-05-26 RX ORDER — DOXYCYCLINE 100 MG/1
100 CAPSULE ORAL 2 TIMES DAILY
Qty: 120 CAPSULE | Refills: 0 | Status: SHIPPED | OUTPATIENT
Start: 2020-05-26 | End: 2020-07-25

## 2020-05-26 RX ORDER — ADAPALENE 45 G/G
GEL TOPICAL AT BEDTIME
Qty: 15 G | Refills: 3 | Status: SHIPPED | OUTPATIENT
Start: 2020-05-26

## 2020-05-26 ASSESSMENT — PATIENT HEALTH QUESTIONNAIRE - PHQ9: SUM OF ALL RESPONSES TO PHQ QUESTIONS 1-9: 4

## 2020-05-26 NOTE — PATIENT INSTRUCTIONS
1. Mild intermittent asthma without complication  Chronic, stable  - albuterol (PROAIR HFA) 108 (90 Base) MCG/ACT inhaler; Inhale 2 puffs into the lungs every 4 hours as needed for shortness of breath / dyspnea  Dispense: 1 Inhaler; Refill: 3  Congratulations on quitting smoking!!!!  I recommend getting a pulmonary function test at some point in the future    2. Hidradenitis suppurativa- chronic recurrence  Chronic, stable  - DERMATOLOGY REFERRAL  - doxycycline hyclate (VIBRAMYCIN) 100 MG capsule; Take 1 capsule (100 mg) by mouth 2 times daily  Dispense: 120 capsule; Refill: 0  - adapalene (DIFFERIN) 0.1 % external gel; Apply topically At Bedtime  Dispense: 15 g; Refill: 3  *This is also OTC at [employer] if you need*  Use antibacterial soap on the inflamed areas  Use Panoxyl shower soap otherwise    PROPER SKIN CARE REGIMEN:    Eliminate harsh soaps (Dial, Zest, [place],  or anything fragrant)    Use mild soaps  (Cetaphil, Eucerin, Dove Sensitive Skin)    Avoid hot or cold showers    Take quick showers (10 minutes)    After showering, lightly pat skin to dry off.     Within 10 minutes of showering, apply a cream moisturizer (Vanicream, Cetaphil, Aquafor, or Cerave)     We recommend using a tub that needs to be scooped out, not a pump. This has more of an oil base. It will hold moisture in your skin much better than a water base moisturizer. The ones recommended are non- pore clogging.        Patient Education     Understanding Hidradenitis Suppurativa  Hidradenitis suppurativa is a long-term (chronic) skin disease. It causes painful bumps and sores (abscesses) to form around a hair follicle. Follicles are the tiny holes from which hair grows out of your skin. The disease occurs on parts of the body where skin rubs together. It most often appears in the armpits, the groin area, and under the breasts. It is more common in women.  How to say it  [id number]   What causes hidradenitis  suppurativa?  This skin disease happens when hair follicles become clogged with keratin. Keratin is the protein that makes up your hair and nails. The follicles then burst and become infected. Pressure or rubbing on the skin can clog the follicles. Or it can further irritate them.  The disease tends to run in families. It s also more likely to occur in people who are obese, have diabetes, or smoke. Hormones may also play a part.  Symptoms of hidradenitis suppurativa  This skin disease causes one or more painful red bumps on the skin. These bumps become infected and drain pus. They may also itch or burn. In severe cases, sinus tracts may form. These are narrow channels that run under the skin. Blood or a bad-smelling pus may ooze from these bumps or sinus tracts. Bands of scarring often occur.  Treatment for hidradenitis suppurativa  Treatment for this skin disease is most successful when started early. But it may be hard to diagnose. It may be mistaken for other skin conditions. The painful bumps also often return. So stopping new bumps and limiting scarring is important. Treatment options include:    Warm compress. Putting a warm, wet washcloth on the affected skin may help.    Lifestyle changes. Your symptoms may get better if you lose weight or stop smoking, if needed. Also avoid shaving or other irritants, such as deodorant or perfume.    Antibiotics. For mild cases, an antibiotic for the skin (topical) may help. You may need oral antibiotics if you have a severe case. They can help prevent further infection.    Other oral medicines. Over-the-counter pain medicines can ease pain and inflammation. You may need stronger medicines for a severe case. These medicines include corticosteroids or a retinoid. These may cause side effects.    Injected medicines. A steroid may be injected into the bump to ease pain. A biologic may be injected to ease severe symptoms.    Surgery. Surgery can drain and remove the painful  bumps. For severe cases, the doctor may cut out the entire area of affected skin or destroy it with a laser.  Complications of hidradenitis suppurativa  These include:    Arthritis    Depression    Lymphedema    Scarring of skin    Skin cancer  When to call your healthcare provider  Call your healthcare provider right away if you have any of these:    Fever of 100.4 F (38 C) or higher, or as directed    Redness, swelling, or fluid leaking from your rash that gets worse    Pain that gets worse    Symptoms that don t get better, or get worse    New symptoms   Date Last Reviewed: 5/1/2016 2000-2019 [employer]. [address] [place], [place] [id number]. All rights reserved. This information is not intended as a substitute for professional medical care. Always follow your healthcare professional's instructions.           Patient Education     Hidradenitis Suppurativa (Antibiotics only)  Hidradenitis suppurativa is chronic inflammation of the sweat glands. It is a severe form of acne. Firm, red, painful bumps called nodules form. They are often filled with pus. They often get larger and may break open and drain. Common affected areas are the armpits, groin, and anal area.  The nodules are not contagious. The condition is not caused by poor hygiene.  You may have been given antibiotics and anti-inflammatory medicines to help treat the flare-up. If nodules keep getting bigger, drainage may be needed. Surgically removing the glands is the most effective treatment in severe cases.  Watch for the signs of early inflammation in the future. Look for small, tender lumps. Then follow the treatment advice below.  Home care  Follow these tips when caring for yourself at home:    If oral antibiotics were prescribed, take all of them as directed.    Make a warm compress by running hot water over a washcloth. Apply it to the area until the compress cools off. Repeat over a 15 minute period. Apply the hot compress 3  times a day for the first 3 days. To avoid spreading bacteria, making sure to use a fresh, clean washcloth each time. Or you can  the shower and direct the warm spray onto the area.    Gently wash the area with antibacterial soap. Avoid scrubbing it.    Put on an over-the-counter antibiotic ointment 3 to 4 times a day, unless another topical medicine was prescribed.    Use over-the-counter medicine to control pain and swelling, unless another pain medicine was given. If you have kidney or liver disease or ever had a stomach ulcer or GI bleeding, talk with your healthcare provider before using this medicine.  Prevention  Try the following to help prevent your condition:    Avoid antiperspirants and deodorants.    Avoid heat and sweating as much as possible.    Avoid shaving the affected area.    If you smoke, consider quitting.    Lose excess weight.    Wear loose clothing. Avoid tight synthetic clothing.  Follow-up care  Follow up with your healthcare provider as advised.  When to seek medical care  Call your healthcare provider right away if any of these occur:    Fever of 100.4 F (38 C) or higher, or as directed by your healthcare provider    Increasing pain    Increasing redness    Nodules keep getting bigger  Date Last Reviewed: 7/1/2016 2000-2019 [employer]. [address] [place] [address], [place] [address]. All rights reserved. This information is not intended as a substitute for professional medical care. Always follow your healthcare professional's instructions.

## 2020-05-26 NOTE — LETTER
My Asthma Action Plan    Name: Matteo Barth   YOB: 1995  Date: 5/26/2020   My doctor: Jania Engle CNP   My clinic: [facility]        My Rescue Medicine:   Albuterol inhaler (Proair/Ventolin/Proventil HFA)  2-4 puffs EVERY 4 HOURS as needed. Use a spacer if recommended by your provider.   My Asthma Severity:   Intermittent / Exercise Induced  Know your asthma triggers: Patient is unaware of triggers.    wearing mask makes breathing worse          GREEN ZONE   Good Control    I feel good    No cough or wheeze    Can work, sleep and play without asthma symptoms       Take your asthma control medicine every day.     1. If exercise triggers your asthma, take your rescue medication    15 minutes before exercise or sports, and    During exercise if you have asthma symptoms  2. Spacer to use with inhaler: If you have a spacer, make sure to use it with your inhaler             YELLOW ZONE Getting Worse  I have ANY of these:    I do not feel good    Cough or wheeze    Chest feels tight    Wake up at night   1. Keep taking your Green Zone medications  2. Start taking your rescue medicine:    every 20 minutes for up to 1 hour. Then every 4 hours for 24-48 hours.  3. If you stay in the Yellow Zone for more than 12-24 hours, contact your doctor.  4. If you do not return to the Green Zone in 12-24 hours or you get worse, start taking your oral steroid medicine if prescribed by your provider.           RED ZONE Medical Alert - Get Help  I have ANY of these:    I feel awful    Medicine is not helping    Breathing getting harder    Trouble walking or talking    Nose opens wide to breathe       1. Take your rescue medicine NOW  2. If your provider has prescribed an oral steroid medicine, start taking it NOW  3. Call your doctor NOW  4. If you are still in the Red Zone after 20 minutes and you have not reached your doctor:    Take your rescue medicine again and    Call 911 or go to the  emergency room right away    See your regular doctor within 2 weeks of an Emergency Room or Urgent Care visit for follow-up treatment.          Annual Reminders:  Meet with Asthma Educator,  Flu Shot in the Fall, consider Pneumonia Vaccination for patients with asthma (aged 19 and older).    Pharmacy:    [id number] - [place], [place] - [address] AT [id number] - [place], [place] - [address]    Electronically signed by Jania Engle CNP   Date: 05/26/20                    Asthma Triggers  How To Control Things That Make Your Asthma Worse    Triggers are things that make your asthma worse.  Look at the list below to help you find your triggers and   what you can do about them. You can help prevent asthma flare-ups by staying away from your triggers.      Trigger                                                          What you can do   Cigarette Smoke  Tobacco smoke can make asthma worse. Do not allow smoking in your home, car or around you.  Be sure no one smokes at a child s day care or school.  If you smoke, ask your health care provider for ways to help you quit.  Ask family members to quit too.  Ask your health care provider for a referral to Quit Plan to help you quit smoking, or call 8-386-523-PLAN.     Colds, Flu, Bronchitis  These are common triggers of asthma. Wash your hands often.  Don t touch your eyes, nose or mouth.  Get a flu shot every year.     Dust Mites  These are tiny bugs that live in cloth or carpet. They are too small to see. Wash sheets and blankets in hot water every week.   Encase pillows and mattress in dust mite proof covers.  Avoid having carpet if you can. If you have carpet, vacuum weekly.   Use a dust mask and HEPA vacuum.   Pollen and Outdoor Mold  Some people are allergic to trees, grass, or weed pollen, or molds. Try to keep your windows closed.  Limit time out doors when pollen count  is high.   Ask you health care provider about taking medicine during allergy season.     Animal Dander  Some people are allergic to skin flakes, urine or saliva from pets with fur or feathers. Keep pets with fur or feathers out of your home.    If you can t keep the pet outdoors, then keep the pet out of your bedroom.  Keep the bedroom door closed.  Keep pets off cloth furniture and away from stuffed toys.     Mice, Rats, and Cockroaches  Some people are allergic to the waste from these pests.   Cover food and garbage.  Clean up spills and food crumbs.  Store grease in the refrigerator.   Keep food out of the bedroom.   Indoor Mold  This can be a trigger if your home has high moisture. Fix leaking faucets, pipes, or other sources of water.   Clean moldy surfaces.  Dehumidify basement if it is damp and smelly.   Smoke, Strong Odors, and Sprays  These can reduce air quality. Stay away from strong odors and sprays, such as perfume, powder, hair spray, paints, smoke incense, paint, cleaning products, candles and new carpet.   Exercise or Sports  Some people with asthma have this trigger. Be active!  Ask your doctor about taking medicine before sports or exercise to prevent symptoms.    Warm up for 5-10 minutes before and after sports or exercise.     Other Triggers of Asthma  Cold air:  Cover your nose and mouth with a scarf.  Sometimes laughing or crying can be a trigger.  Some medicines and food can trigger asthma.

## 2020-05-26 NOTE — PROGRESS NOTES
"  SUBJECTIVE   Matteo Barth is a  female who is being evaluated via a billable video visit.    The patient has been notified of following:   \"This video visit will be conducted via a call between you and your physician/provider. We have found that certain health care needs can be provided without the need for an in-person physical exam.  This service lets us provide the care you need with a video conversation.  If a prescription is necessary we can send it directly to your pharmacy.  If lab work is needed we can place an order for that and you can then stop by our lab to have the test done at a later time.    Video visits are billed at different rates depending on your insurance coverage.  Please reach out to your insurance provider with any questions.    If during the course of the call the physician/provider feels a video visit is not appropriate, you will not be charged for this service.\"    Patient has given verbal consent for Video visit? Yes    How would you like to obtain your AVS? [place]    Patient would like the video invitation sent by: Text to cell phone: [phone]    Will anyone else be joining your video visit? No    If patient encounters technical issues, they should call: [phone]    HPI     Matteo Barth presents with the following concern(s):    Asthma Follow-Up    Was ACT completed today?    Yes    Quit smoking a couple weeks ago  Smoked for 10 years  ACT Total Scores 5/26/2020   ACT TOTAL SCORE (Goal Greater than or Equal to 20) 22   In the past 12 months, how many times did you visit the emergency room for your asthma without being admitted to the hospital? 0   In the past 12 months, how many times were you hospitalized overnight because of your asthma? 0       How many days per week do you miss taking your asthma controller medication?  I do not have an asthma controller medication    Please describe any recent triggers for your asthma: wearing mask makes breathing worse    Have you had any " Emergency Room Visits, Urgent Care Visits, or Hospital Admissions since your last office visit?  No    Skin concern    Duration: worse over last year    Description (location/character/radiation): boil like in arm pits, buttock, inner thigh, under breast     Intensity:  moderate    Accompanying signs and symptoms: drains at times     History (similar episodes/previous evaluation): no evaluation in past     Precipitating or alleviating factors: None    Therapies tried and outcome: OTC creams and soaps- make worse      -getting them since a teenager  -she knows she's overweight  -works in kitchen      Video Start Time:   Start: 05/26/2020 02:00 pm     PCP   [facility] [phone]    Health Maintenance        Health Maintenance Due   Topic Date Due     ASTHMA ACTION PLAN  1996     ASTHMA CONTROL TEST  1996     DEPRESSION ACTION PLAN  1996     PREVENTIVE CARE VISIT  05/29/2008     PHQ-9  12/22/2016     CHLAMYDIA SCREENING  11/27/2018        PROBLEM LIST        Patient Active Problem List   Diagnosis     Hx of attention deficit disorder     Obesity     Major depression in remission (H)     Health Care Home     Photosensitivity due to sun     Adjustment disorder with anxious mood in remission     Mild intermittent asthma without complication     Hidradenitis suppurativa- chronic recurrence     PHQ 6/22/2016 5/26/2020   PHQ-9 Total Score 4 4   Q9: Thoughts of better off dead/self-harm past 2 weeks Not at all Not at all     SALOME-7 SCORE 6/12/2014 6/22/2016   Total Score 5 -   Total Score - 4         MEDICATIONS        Current Outpatient Medications   Medication     adapalene (DIFFERIN) 0.1 % external gel     albuterol (PROAIR HFA) 108 (90 Base) MCG/ACT inhaler     doxycycline hyclate (VIBRAMYCIN) 100 MG capsule     No current facility-administered medications for this visit.        Reviewed and updated as needed this visit by Provider:  Tobacco  Allergies  Meds  Med Hx  Surg Hx  Fam  Hx  Soc Hx     ROS      Constitutional, neuro, ENT, endocrine, pulmonary, cardiac, gastrointestinal, genitourinary, musculoskeletal, integument and psychiatric systems are negative, except as otherwise noted.      PHYSICAL EXAM   There were no vitals taken for this visit  No LMP recorded. (Menstrual status: IUD).    GENERAL: Healthy, alert and no distress  RESP: no audible wheeze, cough, or visible cyanosis.  No visible retractions or increased work of breathing.  Able to speak fully in complete sentences.  PSYCH: mentation appears normal, affect normal/bright, judgement and insight intact, normal speech and appearance well-groomed      ASSESSMENT & PLAN       1. Mild intermittent asthma without complication  Chronic, stable  - albuterol (PROAIR HFA) 108 (90 Base) MCG/ACT inhaler; Inhale 2 puffs into the lungs every 4 hours as needed for shortness of breath / dyspnea  Dispense: 1 Inhaler; Refill: 3  Congratulations on quitting smoking!!!!  I recommend getting a pulmonary function test at some point in the future    2. Hidradenitis suppurativa- chronic recurrence  Chronic, stable  - DERMATOLOGY REFERRAL  - doxycycline hyclate (VIBRAMYCIN) 100 MG capsule; Take 1 capsule (100 mg) by mouth 2 times daily  Dispense: 120 capsule; Refill: 0  - adapalene (DIFFERIN) 0.1 % external gel; Apply topically At Bedtime  Dispense: 15 g; Refill: 3  *This is also OTC at [employer] if you need*  Use antibacterial soap on the inflamed areas  Use Panoxyl shower soap otherwise    PROPER SKIN CARE REGIMEN:    Eliminate harsh soaps (Dial, Zest, [place],  or anything fragrant)    Use mild soaps  (Cetaphil, Eucerin, Dove Sensitive Skin)    Avoid hot or cold showers    Take quick showers (10 minutes)    After showering, lightly pat skin to dry off.     Within 10 minutes of showering, apply a cream moisturizer (Vanicream, Cetaphil, Aquafor, or Cerave)     We recommend using a tub that needs to be scooped out, not a pump. This has more  of an oil base. It will hold moisture in your skin much better than a water base moisturizer. The ones recommended are non- pore clogging.        Patient Education     Understanding Hidradenitis Suppurativa  Hidradenitis suppurativa is a long-term (chronic) skin disease. It causes painful bumps and sores (abscesses) to form around a hair follicle. Follicles are the tiny holes from which hair grows out of your skin. The disease occurs on parts of the body where skin rubs together. It most often appears in the armpits, the groin area, and under the breasts. It is more common in women.  How to say it  [id number]   What causes hidradenitis suppurativa?  This skin disease happens when hair follicles become clogged with keratin. Keratin is the protein that makes up your hair and nails. The follicles then burst and become infected. Pressure or rubbing on the skin can clog the follicles. Or it can further irritate them.  The disease tends to run in families. It s also more likely to occur in people who are obese, have diabetes, or smoke. Hormones may also play a part.  Symptoms of hidradenitis suppurativa  This skin disease causes one or more painful red bumps on the skin. These bumps become infected and drain pus. They may also itch or burn. In severe cases, sinus tracts may form. These are narrow channels that run under the skin. Blood or a bad-smelling pus may ooze from these bumps or sinus tracts. Bands of scarring often occur.  Treatment for hidradenitis suppurativa  Treatment for this skin disease is most successful when started early. But it may be hard to diagnose. It may be mistaken for other skin conditions. The painful bumps also often return. So stopping new bumps and limiting scarring is important. Treatment options include:    Warm compress. Putting a warm, wet washcloth on the affected skin may help.    Lifestyle changes. Your symptoms may get better if you lose weight or stop  smoking, if needed. Also avoid shaving or other irritants, such as deodorant or perfume.    Antibiotics. For mild cases, an antibiotic for the skin (topical) may help. You may need oral antibiotics if you have a severe case. They can help prevent further infection.    Other oral medicines. Over-the-counter pain medicines can ease pain and inflammation. You may need stronger medicines for a severe case. These medicines include corticosteroids or a retinoid. These may cause side effects.    Injected medicines. A steroid may be injected into the bump to ease pain. A biologic may be injected to ease severe symptoms.    Surgery. Surgery can drain and remove the painful bumps. For severe cases, the doctor may cut out the entire area of affected skin or destroy it with a laser.  Complications of hidradenitis suppurativa  These include:    Arthritis    Depression    Lymphedema    Scarring of skin    Skin cancer  When to call your healthcare provider  Call your healthcare provider right away if you have any of these:    Fever of 100.4 F (38 C) or higher, or as directed    Redness, swelling, or fluid leaking from your rash that gets worse    Pain that gets worse    Symptoms that don t get better, or get worse    New symptoms   Date Last Reviewed: 5/1/2016 2000-2019 [employer]. [address] [place] [address], [place] [address]. All rights reserved. This information is not intended as a substitute for professional medical care. Always follow your healthcare professional's instructions.           Patient Education     Hidradenitis Suppurativa (Antibiotics only)  Hidradenitis suppurativa is chronic inflammation of the sweat glands. It is a severe form of acne. Firm, red, painful bumps called nodules form. They are often filled with pus. They often get larger and may break open and drain. Common affected areas are the armpits, groin, and anal area.  The nodules are not contagious. The condition is not caused by poor  hygiene.  You may have been given antibiotics and anti-inflammatory medicines to help treat the flare-up. If nodules keep getting bigger, drainage may be needed. Surgically removing the glands is the most effective treatment in severe cases.  Watch for the signs of early inflammation in the future. Look for small, tender lumps. Then follow the treatment advice below.  Home care  Follow these tips when caring for yourself at home:    If oral antibiotics were prescribed, take all of them as directed.    Make a warm compress by running hot water over a washcloth. Apply it to the area until the compress cools off. Repeat over a 15 minute period. Apply the hot compress 3 times a day for the first 3 days. To avoid spreading bacteria, making sure to use a fresh, clean washcloth each time. Or you can  the shower and direct the warm spray onto the area.    Gently wash the area with antibacterial soap. Avoid scrubbing it.    Put on an over-the-counter antibiotic ointment 3 to 4 times a day, unless another topical medicine was prescribed.    Use over-the-counter medicine to control pain and swelling, unless another pain medicine was given. If you have kidney or liver disease or ever had a stomach ulcer or GI bleeding, talk with your healthcare provider before using this medicine.  Prevention  Try the following to help prevent your condition:    Avoid antiperspirants and deodorants.    Avoid heat and sweating as much as possible.    Avoid shaving the affected area.    If you smoke, consider quitting.    Lose excess weight.    Wear loose clothing. Avoid tight synthetic clothing.  Follow-up care  Follow up with your healthcare provider as advised.  When to seek medical care  Call your healthcare provider right away if any of these occur:    Fever of 100.4 F (38 C) or higher, or as directed by your healthcare provider    Increasing pain    Increasing redness    Nodules keep getting bigger  Date Last Reviewed: 7/1/2016     3781-0734 [employer]. [address] [place] [address]. All rights reserved. This information is not intended as a substitute for professional medical care. Always follow your healthcare professional's instructions.               Risks, benefits, side effects and rationale for treatment plan fully discussed with the patient and understanding expressed.  LUIS AdkinsBC  [facility]    Video-Visit Details  Type of service:  Video Visit    Video End Time: Stop: 05/26/2020 02:26 pm      Originating Location (pt. Location): Home    Distant Location (provider location):  [facility]    Platform used for Video Visit: Tana     Follow-up: see Assessment & Plan

## 2020-05-27 ASSESSMENT — ASTHMA QUESTIONNAIRES: ACT_TOTALSCORE: 22

## 2020-11-29 ENCOUNTER — HEALTH MAINTENANCE LETTER (OUTPATIENT)
Age: 25
End: 2020-11-29

## 2021-02-14 ENCOUNTER — HEALTH MAINTENANCE LETTER (OUTPATIENT)
Age: 26
End: 2021-02-14

## 2021-09-19 ENCOUNTER — HEALTH MAINTENANCE LETTER (OUTPATIENT)
Age: 26
End: 2021-09-19

## 2022-01-09 ENCOUNTER — HEALTH MAINTENANCE LETTER (OUTPATIENT)
Age: 27
End: 2022-01-09

## 2022-11-21 ENCOUNTER — HEALTH MAINTENANCE LETTER (OUTPATIENT)
Age: 27
End: 2022-11-21

## 2023-01-26 ENCOUNTER — ANCILLARY PROCEDURE (OUTPATIENT)
Dept: GENERAL RADIOLOGY | Facility: CLINIC | Age: 28
End: 2023-01-26
Attending: NURSE PRACTITIONER
Payer: COMMERCIAL

## 2023-01-26 ENCOUNTER — OFFICE VISIT (OUTPATIENT)
Dept: URGENT CARE | Facility: URGENT CARE | Age: 28
End: 2023-01-26
Payer: COMMERCIAL

## 2023-01-26 VITALS
OXYGEN SATURATION: 96 % | WEIGHT: 293 LBS | TEMPERATURE: 98 F | HEART RATE: 90 BPM | RESPIRATION RATE: 18 BRPM | DIASTOLIC BLOOD PRESSURE: 76 MMHG | BODY MASS INDEX: 58.63 KG/M2 | SYSTOLIC BLOOD PRESSURE: 126 MMHG

## 2023-01-26 DIAGNOSIS — J20.9 ACUTE BRONCHITIS, UNSPECIFIED ORGANISM: Primary | ICD-10-CM

## 2023-01-26 DIAGNOSIS — J45.901 EXACERBATION OF ASTHMA, UNSPECIFIED ASTHMA SEVERITY, UNSPECIFIED WHETHER PERSISTENT: ICD-10-CM

## 2023-01-26 DIAGNOSIS — R06.02 SHORTNESS OF BREATH: ICD-10-CM

## 2023-01-26 DIAGNOSIS — H61.23 BILATERAL IMPACTED CERUMEN: ICD-10-CM

## 2023-01-26 PROCEDURE — 69209 REMOVE IMPACTED EAR WAX UNI: CPT | Mod: 50 | Performed by: NURSE PRACTITIONER

## 2023-01-26 PROCEDURE — 99214 OFFICE O/P EST MOD 30 MIN: CPT | Mod: 25 | Performed by: NURSE PRACTITIONER

## 2023-01-26 PROCEDURE — 71046 X-RAY EXAM CHEST 2 VIEWS: CPT | Mod: TC | Performed by: RADIOLOGY

## 2023-01-26 RX ORDER — ALBUTEROL SULFATE 90 UG/1
2 AEROSOL, METERED RESPIRATORY (INHALATION) ONCE
Status: COMPLETED | OUTPATIENT
Start: 2023-01-26 | End: 2023-01-26

## 2023-01-26 RX ADMIN — ALBUTEROL SULFATE 2 PUFF: 90 INHALANT RESPIRATORY (INHALATION) at 12:10

## 2023-01-26 NOTE — PROGRESS NOTES
Assessment & Plan     Acute bronchitis, unspecified organism    Shortness of breath  - XR Chest 2 Views  - albuterol (PROVENTIL HFA/VENTOLIN HFA) inhaler    Bilateral impacted cerumen  - W[place] REMOVAL IMPACTED CERUMEN IRRIGATION/LVG UNILAT    Exacerbation of asthma, unspecified asthma severity, unspecified whether persistent      For asthma exacerbation she is given 2 puffs of albuterol inhaler during visit and inhaler to take home as needed every 6 hours for wheezing/shortness of breath and reports improvement in her shortness of breath. Reviewed chest xray images and results during visit showing no obvious pneumonia.     Discussed bronchitis symptoms viral and antibiotic not indicated currently. Cough can linger for a few weeks. Recommended rest, fluids especially warm clear liquids such as tea with honey and lemon, decreasing dairy which can increase congestion, nasal irrigation with saline, flonase nasal spray. She declines tessalon prescription at this time.           PROCEDURE:  Cerumen removal done in bilateral ears via lavage done by MA and supervised by myself    Patient reports immediate improvement in bilateral ears after lavage. Bilateral ear canals and TM normal after lavage.    Follow-up with PCP if symptoms persist for 7 days, and sooner if symptoms worsen or new symptoms develop.     Discussed red flag symptoms which warrant immediate visit in emergency room    All questions were answered and patient verbalized understanding. AVS reviewed with patient.     Sheela Millan, JAKI, APRN, CNP 1/26/2023 1:21 PM  [facility]          Yue Felipe is a 28 year old female who presents to clinic today for the following health issues:  Chief Complaint   Patient presents with     Nasal Congestion     Sx today is day 6.Covid test 3 days ago and nothing.      Ear Problem     Sx pressure and feel clogged up and dificulty hearing.     Refill Request     Inhaler      Fever      Sx  previously , but not today      Cough     Shortness of Breath     Patient presents for evaluation of shortness of breath. Associated symptoms: nasal congestion, cough, fever, ear pressure and decreased hearing, wheezing. Temp was 101F and has resolved. Symptoms have been present for 6 days and have been worsening. Denies throat pain. Shortness of breath is severe and is worse with walking and talking.  She tested for COVID 3 days ago and test was negative. She has been out of her albuterol inhaler for a year and would like a refill.     She has a history of asthma, obesity. She quit smoking 1 year ago.     Problem list, Medication list, Allergies, and Medical history reviewed in EPIC.    ROS:  Review of systems negative except for noted above        Objective    /76   Pulse 90   Temp 98  F (36.7  C) (Tympanic)   Resp 18   Wt (!) 150.1 kg (331 lb)   SpO2 96%   BMI 58.63 kg/m    Physical Exam  Constitutional:       General: She is not in acute distress.     Appearance: She is obese. She is not toxic-appearing or diaphoretic.   HENT:      Head: Normocephalic and atraumatic.      Right Ear: External ear normal. There is impacted cerumen.      Left Ear: External ear normal. There is impacted cerumen.      Nose:      Right Sinus: No maxillary sinus tenderness or frontal sinus tenderness.      Left Sinus: No maxillary sinus tenderness or frontal sinus tenderness.      Comments: Mild nasal congestion     Mouth/Throat:      Mouth: Mucous membranes are moist.      Pharynx: Oropharynx is clear. No oropharyngeal exudate or posterior oropharyngeal erythema.      Tonsils: No tonsillar abscesses. 0 on the right. 0 on the left.   Eyes:      Conjunctiva/sclera: Conjunctivae normal.   Cardiovascular:      Rate and Rhythm: Normal rate and regular rhythm.      Heart sounds: Normal heart sounds.   Pulmonary:      Effort: Pulmonary effort is normal. No respiratory distress.      Breath sounds: Wheezing present. No rhonchi or  rales.   Lymphadenopathy:      Cervical: No cervical adenopathy.   Skin:     General: Skin is warm and dry.   Neurological:      Mental Status: She is alert.          X-ray chest was performed and reviewed independently by myself showing no obvious pneumonia or consolidation  Radiologist impression:   Results for orders placed or performed in visit on 01/26/23   XR Chest 2 Views     Status: None    Narrative    CHEST TWO VIEWS  1/26/2023 12:19 PM     HISTORY: Cough, shortness of breath, wheezing.    COMPARISON: None.       Impression    IMPRESSION:  There are no acute infiltrates. The cardiac silhouette is  not enlarged. Pulmonary vasculature is unremarkable.     LAMONT LAWS MD         SYSTEM ID:  [id number]

## 2023-01-26 NOTE — PATIENT INSTRUCTIONS
Rest, fluids especially warm clear liquids such as tea with honey and lemon, decreasing dairy which can increase congestion, nasal irrigation with saline, flonase nasal spray    Albuterol inhaler  2 puffs (5 minutes apart) every 6 hours as needed for wheezing, shortness of breath

## 2023-04-16 ENCOUNTER — HEALTH MAINTENANCE LETTER (OUTPATIENT)
Age: 28
End: 2023-04-16

## 2024-04-19 ENCOUNTER — OFFICE VISIT (OUTPATIENT)
Dept: URGENT CARE | Facility: URGENT CARE | Age: 29
End: 2024-04-19

## 2024-04-19 VITALS
TEMPERATURE: 97.4 F | WEIGHT: 293 LBS | OXYGEN SATURATION: 95 % | BODY MASS INDEX: 63.68 KG/M2 | SYSTOLIC BLOOD PRESSURE: 141 MMHG | DIASTOLIC BLOOD PRESSURE: 100 MMHG | HEART RATE: 74 BPM

## 2024-04-19 DIAGNOSIS — J45.901 EXACERBATION OF ASTHMA, UNSPECIFIED ASTHMA SEVERITY, UNSPECIFIED WHETHER PERSISTENT: Primary | ICD-10-CM

## 2024-04-19 DIAGNOSIS — R03.0 ELEVATED BLOOD PRESSURE READING: ICD-10-CM

## 2024-04-19 PROCEDURE — 94640 AIRWAY INHALATION TREATMENT: CPT | Performed by: PHYSICIAN ASSISTANT

## 2024-04-19 PROCEDURE — 99204 OFFICE O/P NEW MOD 45 MIN: CPT | Performed by: PHYSICIAN ASSISTANT

## 2024-04-19 RX ORDER — IPRATROPIUM BROMIDE AND ALBUTEROL SULFATE 2.5; .5 MG/3ML; MG/3ML
3 SOLUTION RESPIRATORY (INHALATION) ONCE
Status: COMPLETED | OUTPATIENT
Start: 2024-04-19 | End: 2024-04-19

## 2024-04-19 RX ORDER — PREDNISONE 20 MG/1
TABLET ORAL
Qty: 20 TABLET | Refills: 0 | Status: SHIPPED | OUTPATIENT
Start: 2024-04-19

## 2024-04-19 RX ADMIN — IPRATROPIUM BROMIDE AND ALBUTEROL SULFATE 3 ML: 2.5; .5 SOLUTION RESPIRATORY (INHALATION) at 19:45

## 2024-04-19 NOTE — LETTER
April 19, 2024      Matteo Barth  [address] [address]        To Whom It May Concern:    Matteo Barth was seen in our clinic. She may return to work without restrictions.      Sincerely,        KEVIN Pratt

## 2024-04-20 NOTE — PROGRESS NOTES
SUBJECTIVE:   Matteo Barth is a 29 year old female presenting with a chief complaint of   Chief Complaint   Patient presents with    Trouble Breathing     Patient seen in clinic today for trouble breathing. This started on Monday / Tuesday. She states it's just her chest and lungs nothing else. Her inhaler is not working.       She is an established patient of [facility].    Matteo Barth is a pleasant 29 year old female with history of asthma, smoking and obesity who presents to the [facility] for evaluation of dyspnea. States this started around 3 days ago, worsened with standing up and moving. Denies chest pain. States that she has tried using her albuterol inhaler, which she refilled when her symptoms started, and this has not helped. She denies fevers or chills. She does endorse daily marijuana use. She has not been on oral or inhaled steroids recently.    Review of Systems   All other systems reviewed and are negative.      Past Medical History:   Diagnosis Date    Anxiety     Asthma Diagnosed during childhood    Exercise induced symptoms.     Attention deficit disorder with hyperactivity(314.01)     Depression     Obesity, unspecified      Family History   Problem Relation Age of Onset    Blood Disease Mother         hypoglycemic    Alcohol/Drug Mother     Depression Mother     Allergies Father         seasonal    Alcohol/Drug Father     Depression Maternal Grandmother     Alcohol/Drug Maternal Grandmother     Cancer Maternal Grandfather         lung cancer, skin cancer    Alcohol/Drug Maternal Grandfather     Respiratory Maternal Grandfather         copd    Respiratory Paternal Grandmother         emphysema    Depression Paternal Grandmother      Current Outpatient Medications   Medication Sig Dispense Refill    predniSONE (DELTASONE) 20 MG tablet Take 3 tabs by mouth daily x 3 days, then 2 tabs daily x 3 days, then 1 tab daily x 3 days, then 1/2 tab daily x 3 days. 20 tablet 0    adapalene (DIFFERIN) 0.1 %  external gel Apply topically At Bedtime (Patient not taking: Reported on 1/26/2023) 15 g 3    albuterol (PROAIR HFA) 108 (90 Base) MCG/ACT inhaler Inhale 2 puffs into the lungs every 4 hours as needed for shortness of breath / dyspnea 1 Inhaler 3     Social History     Tobacco Use    Smoking status: Former     Current packs/day: 0.50     Average packs/day: 0.5 packs/day for 0.3 years (0.1 ttl pk-yrs)     Types: Cigarettes     Start date: 2024    Smokeless tobacco: Never    Tobacco comments:     mom doesn't smoke around the kids   Substance Use Topics    Alcohol use: Yes     Comment: rare       OBJECTIVE  BP (!) 141/100 (BP Location: Left arm, Patient Position: Sitting, Cuff Size: Adult Large)   Pulse 74   Temp 97.4  F (36.3  C) (Tympanic)   Wt (!) 163.1 kg (359 lb 8 oz)   SpO2 95%   BMI 63.68 kg/m      Physical Exam  Vitals and nursing note reviewed.   Constitutional:       General: She is not in acute distress.     Appearance: Normal appearance. She is obese. She is not ill-appearing.   HENT:      Head: Normocephalic and atraumatic.      Right Ear: Tympanic membrane, ear canal and external ear normal.      Left Ear: Tympanic membrane, ear canal and external ear normal.      Nose: Nose normal.      Mouth/Throat:      Mouth: Mucous membranes are moist.      Pharynx: Oropharynx is clear.   Eyes:      Extraocular Movements: Extraocular movements intact.      Conjunctiva/sclera: Conjunctivae normal.   Cardiovascular:      Rate and Rhythm: Normal rate and regular rhythm.      Pulses: Normal pulses.      Heart sounds: Normal heart sounds.   Pulmonary:      Effort: Pulmonary effort is normal.      Breath sounds: Wheezing present.   Musculoskeletal:      Cervical back: Normal range of motion.   Skin:     General: Skin is warm and dry.      Findings: No rash.   Neurological:      General: No focal deficit present.      Mental Status: She is alert.   Psychiatric:         Mood and Affect: Mood normal.         Behavior:  Behavior normal.         Labs:  No results found for this or any previous visit (from the past 24 hour(s)).      ASSESSMENT:      ICD-10-CM    1. Exacerbation of asthma, unspecified asthma severity, unspecified whether persistent  J45.901 predniSONE (DELTASONE) 20 MG tablet     ipratropium - albuterol 0.5 mg/2.5 mg/3 mL (DUONEB) neb solution 3 mL      2. Elevated blood pressure reading  R03.0            Medical Decision Making:    Differential Diagnosis:  Viral Upper Respiratory Infection, Viral Syndrome, Asthma Exacerbation, Anxiety    Serious Comorbid Conditions:  Adult:  Asthma and Obesity, smoking history    PLAN:    Last steroids long time ago.  Rx for prednisone.  Given treatment here.  Note for work.  Discussed reasons to seek immediate medical attention.  Additionally if no improvement or worsening in one week, may follow up with PCP and/or UC.    Blood pressure to be repeated before discharge.  Patient asked to monitor blood pressure and follow up with PCP for management.        Followup:  If not improving or if condition worsens, follow up with your Primary Care Provider, If not improving or if conditions worsens over the next 12-24 hours, go to the Emergency Department    KEVIN Salinas-S2

## 2024-06-23 ENCOUNTER — HEALTH MAINTENANCE LETTER (OUTPATIENT)
Age: 29
End: 2024-06-23

## 2025-07-12 ENCOUNTER — HEALTH MAINTENANCE LETTER (OUTPATIENT)
Age: 30
End: 2025-07-12

## 2025-07-24 ENCOUNTER — OFFICE VISIT (OUTPATIENT)
Dept: FAMILY MEDICINE | Facility: CLINIC | Age: 30
End: 2025-07-24
Payer: MEDICAID

## 2025-07-24 VITALS
WEIGHT: 293 LBS | BODY MASS INDEX: 51.91 KG/M2 | SYSTOLIC BLOOD PRESSURE: 120 MMHG | HEART RATE: 99 BPM | TEMPERATURE: 97.6 F | HEIGHT: 63 IN | OXYGEN SATURATION: 99 % | RESPIRATION RATE: 18 BRPM | DIASTOLIC BLOOD PRESSURE: 80 MMHG

## 2025-07-24 DIAGNOSIS — J45.20 MILD INTERMITTENT ASTHMA WITHOUT COMPLICATION: Chronic | ICD-10-CM

## 2025-07-24 DIAGNOSIS — E66.813 CLASS 3 SEVERE OBESITY WITHOUT SERIOUS COMORBIDITY WITH BODY MASS INDEX (BMI) OF 60.0 TO 69.9 IN ADULT, UNSPECIFIED OBESITY TYPE (H): Primary | Chronic | ICD-10-CM

## 2025-07-24 RX ORDER — ALBUTEROL SULFATE 90 UG/1
2 INHALANT RESPIRATORY (INHALATION) EVERY 4 HOURS PRN
Qty: 8.5 G | Refills: 2 | Status: SHIPPED | OUTPATIENT
Start: 2025-07-24

## 2025-07-24 ASSESSMENT — ASTHMA QUESTIONNAIRES
QUESTION_2 LAST FOUR WEEKS HOW OFTEN HAVE YOU HAD SHORTNESS OF BREATH: MORE THAN ONCE A DAY
QUESTION_4 LAST FOUR WEEKS HOW OFTEN HAVE YOU USED YOUR RESCUE INHALER OR NEBULIZER MEDICATION (SUCH AS ALBUTEROL): TWO OR THREE TIMES PER WEEK
ACT_TOTALSCORE: 16
QUESTION_3 LAST FOUR WEEKS HOW OFTEN DID YOUR ASTHMA SYMPTOMS (WHEEZING, COUGHING, SHORTNESS OF BREATH, CHEST TIGHTNESS OR PAIN) WAKE YOU UP AT NIGHT OR EARLIER THAN USUAL IN THE MORNING: NOT AT ALL
QUESTION_5 LAST FOUR WEEKS HOW WOULD YOU RATE YOUR ASTHMA CONTROL: SOMEWHAT CONTROLLED
QUESTION_1 LAST FOUR WEEKS HOW MUCH OF THE TIME DID YOUR ASTHMA KEEP YOU FROM GETTING AS MUCH DONE AT WORK, SCHOOL OR AT HOME: A LITTLE OF THE TIME

## 2025-07-24 ASSESSMENT — PATIENT HEALTH QUESTIONNAIRE - PHQ9
SUM OF ALL RESPONSES TO PHQ QUESTIONS 1-9: 6
SUM OF ALL RESPONSES TO PHQ QUESTIONS 1-9: 6
10. IF YOU CHECKED OFF ANY PROBLEMS, HOW DIFFICULT HAVE THESE PROBLEMS MADE IT FOR YOU TO DO YOUR WORK, TAKE CARE OF THINGS AT HOME, OR GET ALONG WITH OTHER PEOPLE: SOMEWHAT DIFFICULT

## 2025-07-24 ASSESSMENT — PAIN SCALES - GENERAL: PAINLEVEL_OUTOF10: NO PAIN (0)

## 2025-07-24 NOTE — PROGRESS NOTES
"  Assessment & Plan   Problem List Items Addressed This Visit       Obesity - Primary (Chronic)    Relevant Orders    Albumin Random Urine Quantitative with Creat Ratio    Comprehensive metabolic panel (BMP + Alb, Alk Phos, ALT, AST, Total. Bili, TP)    Adult Comprehensive Weight Management  Referral      Assessment & Plan  Fluid retention in the abdomen:  - Potential causes of fluid retention include nephrotic syndrome, liver dysfunction, and kidney dysfunction. Further investigation is needed to rule out medical causes.  - Order basic blood work to assess liver and kidney function. Obtain a urine sample to check for nephrotic syndrome.    Class 3 severe obesity without serious comorbidity:  - Referral to a weight management clinic for evaluation and potential treatment options, including medications like GLP1. Consideration of gastric bypass/banding surgery if appropriate. Referral for a primary care provider for ongoing management.  - Risks and side effects: Discussed potential side effects and downsides of weight loss medications, including cost and insurance coverage issues.Complete history and physical exam as below. Afebrile with normal vital signs.    DDx and Dx discussed with and explained to the pt to their satisfaction.  All questions were answered at this time. Pt expressed understanding of and agreement with this dx, tx, and plan. No further workup warranted and standard medication warnings given. I have given the patient a list of pertinent indications for re-evaluation. Will go to the Emergency Department if symptoms worsen or new concerning symptoms arise. Patient left in no apparent distress.   BMI  Estimated body mass index is 60.26 kg/m  as calculated from the following:    Height as of this encounter: 1.6 m (5' 3\").    Weight as of this encounter: 154.3 kg (340 lb 3.2 oz).   Weight management plan: Patient referred to endocrine and/or weight management specialty Patient was referred to " their PCP to discuss a diet and exercise plan.    Follow-up  Return in about 3 months (around 10/24/2025) for an appointment to establish care with a primary, a recheck as needed.    Yue Felipe is a 30 year old, presenting for the following health issues:  Gastrointestinal Problem        2025     9:50 AM   Additional Questions   Roomed by Yareli Schultz CMA   Accompanied by N/A         2025     9:50 AM   Patient Reported Additional Medications   Patient reports taking the following new medications No new medications     History of Present Illness       Reason for visit:  Water build up in stomach and talk about weight management    She eats 0-1 servings of fruits and vegetables daily.She consumes 1 sweetened beverage(s) daily.She exercises with enough effort to increase her heart rate 10 to 19 minutes per day.  She exercises with enough effort to increase her heart rate 5 days per week.   She is taking medications regularly.   Matteo Barth, 30-year-old female  - Persistent sensation of fluid retention in the abdomen since pregnancy, ongoing for 4 years  - Noted fluid retention began during pregnancy, advised at that time it would resolve within a year, but has persisted  - Reports weight gain and difficulty controlling weight, with most weight distributed in the abdomen  - Occasional swelling in ankles  - Some days with increased urinary frequency, feels like water retention is partially relieved but some persists  - Has tried diuretics and fasting multiple times without full resolution of symptoms  - No daily medications taken for any condition  - No history of heart issues during pregnancies  - No significant shortness of breath  - Uses inhaler occasionally, smokes marijuana  - No prior evaluation by weight   - History of  for childbirth 4 years ago  Patient is coming in today due to water retention in the lower stomach area since giving birth 4 years ago.  She has  "tried diuretics, fasting, etc to help but it only goes away some before coming back.  Patient notes that when it starts to go away there has been itchiness, and pain.    Patient would also like to have her albuterol refilled.    Weight Management         7/24/2025   Return Weight Management Questionnaire   I have made the following changes to my diet since my last visit Avoiding over eating;Avoiding emotional eating   With regards to my diet, I am still struggling with Portion control;Choosing foods that are not nutritious;Eating more than I wanted to;Eating when I am full;Eating when I feel stressed or upset   I have made the following changes to my activity/exercise since my last visit Increased my steps per day   With regards to my activity/exercise, I am still struggling with Pain / discomfort when I'm active;Feeling too tired to exercise;Unsure what to do for exercise;Worried people will look at me   Are you taking your weight loss medication as prescribed I am not prescribed a medication for weight loss            Primary Care Weight Management History           7/24/2025   Weight Management Vitals   Last Visits Weight  359.5           7/24/2025   PHQ-9 Total Score   PHQ-9 Total Score 6        Patient-reported     Lab work is in process      Review of Systems  Constitutional, HEENT, cardiovascular, pulmonary, gi and gu systems are negative, except as otherwise noted.      Objective    /80   Pulse 99   Temp 97.6  F (36.4  C) (Temporal)   Resp 18   Ht 1.6 m (5' 3\")   Wt (!) 154.3 kg (340 lb 3.2 oz)   LMP 06/24/2025 (Within Days)   SpO2 99%   BMI 60.26 kg/m    Body mass index is 60.26 kg/m .  Physical Exam  Vitals and nursing note reviewed.   Constitutional:       General: She is not in acute distress.     Appearance: She is not ill-appearing or diaphoretic.   HENT:      Head: Normocephalic and atraumatic.      Mouth/Throat:      Mouth: Mucous membranes are moist.   Eyes:      Conjunctiva/sclera: " Conjunctivae normal.   Cardiovascular:      Rate and Rhythm: Normal rate and regular rhythm.      Heart sounds: Normal heart sounds. No murmur heard.     No friction rub. No gallop.      Comments: 2+ symmetric radial/PT pulses. No LE edema or tenderness.  Pulmonary:      Effort: Pulmonary effort is normal. No respiratory distress.      Breath sounds: Normal breath sounds. No stridor. No wheezing, rhonchi or rales.   Abdominal:      General: Bowel sounds are normal. There is no distension.      Palpations: Abdomen is soft. There is no mass.      Tenderness: There is no abdominal tenderness. There is no guarding or rebound.      Hernia: No hernia is present.      Comments: Panus non-tender.   Skin:     General: Skin is warm and dry.   Neurological:      General: No focal deficit present.      Mental Status: She is alert. Mental status is at baseline.   Psychiatric:         Mood and Affect: Mood normal.         Behavior: Behavior normal.          No results found for any visits on 07/24/25.        Signed Electronically by: KEVIN Pruitt

## 2025-07-24 NOTE — PATIENT INSTRUCTIONS
Oswald Felipe,    Thank you for allowing [facility] to manage your care.    I am unsure of the cause of your symptoms, but we will see what our workup shows.     If you develop worsening/changing symptoms at any time such as abdominal pain, fever, bowel/bladder symptoms or other worrisome symptoms, please be seen in clinic/urgent care.    I ordered some lab work. Please go to the laboratory to get your studies.    I made a referral to the comprehensive weight management clinic. They will be calling in approximately 1 week to set up your appointment.  If you do not hear from them, please call the specialty number on your after visit summary.     Please allow 1-2 business days for our office to contact you in regards to your laboratory/radiological studies.  If not done so, I encourage you to login into [email or web address] [email or web address] to review your results as well.     If you have any questions or concerns, please feel free to call us at [phone]    Sincerely,    Max Tam PA-C    Did you know?      You can schedule a video visit for follow-up appointments as well as future appointments for certain conditions.  Please see the below link.     [email or web address]    If you have not already done so,  I encourage you to sign up for [email or web address] ([email or web address]).  This will allow you to review your results, securely communicate with a provider, and schedule virtual visits as well.

## 2025-07-28 ENCOUNTER — PATIENT OUTREACH (OUTPATIENT)
Dept: CARE COORDINATION | Facility: CLINIC | Age: 30
End: 2025-07-28
Payer: MEDICAID

## 2025-08-25 ENCOUNTER — LAB (OUTPATIENT)
Dept: LAB | Facility: CLINIC | Age: 30
End: 2025-08-25
Payer: COMMERCIAL

## 2025-08-25 ENCOUNTER — OFFICE VISIT (OUTPATIENT)
Dept: SURGERY | Facility: CLINIC | Age: 30
End: 2025-08-25
Payer: COMMERCIAL

## 2025-08-25 VITALS
HEIGHT: 63 IN | WEIGHT: 293 LBS | SYSTOLIC BLOOD PRESSURE: 132 MMHG | DIASTOLIC BLOOD PRESSURE: 72 MMHG | BODY MASS INDEX: 51.91 KG/M2

## 2025-08-25 DIAGNOSIS — E66.813 CLASS 3 SEVERE OBESITY WITHOUT SERIOUS COMORBIDITY WITH BODY MASS INDEX (BMI) OF 60.0 TO 69.9 IN ADULT, UNSPECIFIED OBESITY TYPE (H): Chronic | ICD-10-CM

## 2025-08-25 DIAGNOSIS — G47.19 EXCESSIVE DAYTIME SLEEPINESS: Primary | ICD-10-CM

## 2025-08-25 DIAGNOSIS — Z86.32 HISTORY OF GESTATIONAL DIABETES: ICD-10-CM

## 2025-08-25 LAB
ERYTHROCYTE [DISTWIDTH] IN BLOOD BY AUTOMATED COUNT: 13.8 % (ref 10–15)
HCT VFR BLD AUTO: 38.5 % (ref 35–47)
HGB BLD-MCNC: 12.6 G/DL (ref 11.7–15.7)
MCH RBC QN AUTO: 27.1 PG (ref 26.5–33)
MCHC RBC AUTO-ENTMCNC: 32.7 G/DL (ref 31.5–36.5)
MCV RBC AUTO: 82.8 FL (ref 78–100)
PLATELET # BLD AUTO: 292 10E3/UL (ref 150–450)
PROLACTIN SERPL 3RD IS-MCNC: 12 NG/ML (ref 5–23)
RBC # BLD AUTO: 4.65 10E6/UL (ref 3.8–5.2)
T4 FREE SERPL-MCNC: 1.01 NG/DL (ref 0.9–1.7)
TSH SERPL DL<=0.005 MIU/L-ACNC: 1.43 UIU/ML (ref 0.3–4.2)
VIT B12 SERPL-MCNC: 575 PG/ML (ref 232–1245)
WBC # BLD AUTO: 10.51 10E3/UL (ref 4–11)

## 2025-08-25 PROCEDURE — 36415 COLL VENOUS BLD VENIPUNCTURE: CPT

## 2025-08-25 PROCEDURE — 84146 ASSAY OF PROLACTIN: CPT

## 2025-08-25 PROCEDURE — 84443 ASSAY THYROID STIM HORMONE: CPT

## 2025-08-25 PROCEDURE — 99205 OFFICE O/P NEW HI 60 MIN: CPT | Performed by: EMERGENCY MEDICINE

## 2025-08-25 PROCEDURE — 84439 ASSAY OF FREE THYROXINE: CPT

## 2025-08-25 PROCEDURE — 85027 COMPLETE CBC AUTOMATED: CPT

## 2025-08-25 PROCEDURE — 82607 VITAMIN B-12: CPT

## 2025-08-25 RX ORDER — SEMAGLUTIDE 0.25 MG/.5ML
0.25 INJECTION, SOLUTION SUBCUTANEOUS WEEKLY
Qty: 2 ML | Refills: 0 | Status: SHIPPED | OUTPATIENT
Start: 2025-08-29 | End: 2025-09-26

## 2025-08-25 RX ORDER — SEMAGLUTIDE 0.5 MG/.5ML
0.5 INJECTION, SOLUTION SUBCUTANEOUS
Qty: 2 ML | Refills: 0 | Status: SHIPPED | OUTPATIENT
Start: 2025-09-26 | End: 2025-10-24

## 2025-08-25 RX ORDER — SEMAGLUTIDE 2.4 MG/.75ML
2.4 INJECTION, SOLUTION SUBCUTANEOUS
Qty: 3 ML | Refills: 9 | Status: SHIPPED | OUTPATIENT
Start: 2025-12-19 | End: 2026-09-15

## 2025-08-25 RX ORDER — SEMAGLUTIDE 1 MG/.5ML
1 INJECTION, SOLUTION SUBCUTANEOUS
Qty: 2 ML | Refills: 0 | Status: SHIPPED | OUTPATIENT
Start: 2025-10-24 | End: 2025-11-21

## 2025-08-25 RX ORDER — SEMAGLUTIDE 1.7 MG/.75ML
1.7 INJECTION, SOLUTION SUBCUTANEOUS
Qty: 3 ML | Refills: 0 | Status: SHIPPED | OUTPATIENT
Start: 2025-11-21 | End: 2025-12-19

## 2025-08-25 ASSESSMENT — SLEEP AND FATIGUE QUESTIONNAIRES
HOW LIKELY ARE YOU TO NOD OFF OR FALL ASLEEP WHILE SITTING AND TALKING TO SOMEONE: SLIGHT CHANCE OF DOZING
HOW LIKELY ARE YOU TO NOD OFF OR FALL ASLEEP IN A CAR, WHILE STOPPED FOR A FEW MINUTES IN TRAFFIC: SLIGHT CHANCE OF DOZING
HOW LIKELY ARE YOU TO NOD OFF OR FALL ASLEEP WHILE WATCHING TV: MODERATE CHANCE OF DOZING
HOW LIKELY ARE YOU TO NOD OFF OR FALL ASLEEP WHILE LYING DOWN TO REST IN THE AFTERNOON WHEN CIRCUMSTANCES PERMIT: HIGH CHANCE OF DOZING
HOW LIKELY ARE YOU TO NOD OFF OR FALL ASLEEP WHILE SITTING QUIETLY AFTER LUNCH WITHOUT ALCOHOL: SLIGHT CHANCE OF DOZING
HOW LIKELY ARE YOU TO NOD OFF OR FALL ASLEEP WHILE SITTING INACTIVE IN A PUBLIC PLACE: MODERATE CHANCE OF DOZING
HOW LIKELY ARE YOU TO NOD OFF OR FALL ASLEEP WHILE SITTING AND READING: HIGH CHANCE OF DOZING
HOW LIKELY ARE YOU TO NOD OFF OR FALL ASLEEP WHEN YOU ARE A PASSENGER IN A CAR FOR AN HOUR WITHOUT A BREAK: SLIGHT CHANCE OF DOZING

## 2025-08-26 ENCOUNTER — PATIENT OUTREACH (OUTPATIENT)
Dept: CARE COORDINATION | Facility: CLINIC | Age: 30
End: 2025-08-26

## 2025-08-26 ENCOUNTER — VIRTUAL VISIT (OUTPATIENT)
Dept: SURGERY | Facility: CLINIC | Age: 30
End: 2025-08-26
Payer: COMMERCIAL

## 2025-08-26 ENCOUNTER — OFFICE VISIT (OUTPATIENT)
Dept: FAMILY MEDICINE | Facility: CLINIC | Age: 30
End: 2025-08-26
Payer: COMMERCIAL

## 2025-08-26 VITALS
HEIGHT: 63 IN | BODY MASS INDEX: 51.91 KG/M2 | OXYGEN SATURATION: 97 % | SYSTOLIC BLOOD PRESSURE: 125 MMHG | DIASTOLIC BLOOD PRESSURE: 86 MMHG | HEART RATE: 79 BPM | WEIGHT: 293 LBS | RESPIRATION RATE: 18 BRPM | TEMPERATURE: 98.7 F

## 2025-08-26 DIAGNOSIS — Z11.3 SCREENING EXAMINATION FOR VENEREAL DISEASE: ICD-10-CM

## 2025-08-26 DIAGNOSIS — Z71.3 NUTRITIONAL COUNSELING: ICD-10-CM

## 2025-08-26 DIAGNOSIS — Z12.4 CERVICAL CANCER SCREENING: Primary | ICD-10-CM

## 2025-08-26 DIAGNOSIS — E66.01 MORBID OBESITY WITH BMI OF 50.0-59.9, ADULT (H): Primary | ICD-10-CM

## 2025-08-26 LAB
CLUE CELLS: NORMAL
TRICHOMONAS, WET PREP: NORMAL
WBC'S/HIGH POWER FIELD, WET PREP: NORMAL
YEAST, WET PREP: NORMAL

## 2025-08-26 PROCEDURE — 36415 COLL VENOUS BLD VENIPUNCTURE: CPT | Performed by: NURSE PRACTITIONER

## 2025-08-26 PROCEDURE — 87210 SMEAR WET MOUNT SALINE/INK: CPT | Performed by: NURSE PRACTITIONER

## 2025-08-26 PROCEDURE — 98001 SYNCH AUDIO-VIDEO NEW LOW 30: CPT | Performed by: DIETITIAN, REGISTERED

## 2025-08-26 PROCEDURE — 87491 CHLMYD TRACH DNA AMP PROBE: CPT | Performed by: NURSE PRACTITIONER

## 2025-08-26 PROCEDURE — 86803 HEPATITIS C AB TEST: CPT | Performed by: NURSE PRACTITIONER

## 2025-08-26 PROCEDURE — 99213 OFFICE O/P EST LOW 20 MIN: CPT | Performed by: NURSE PRACTITIONER

## 2025-08-26 PROCEDURE — 87591 N.GONORRHOEAE DNA AMP PROB: CPT | Performed by: NURSE PRACTITIONER

## 2025-08-26 PROCEDURE — 86780 TREPONEMA PALLIDUM: CPT | Performed by: NURSE PRACTITIONER

## 2025-08-26 PROCEDURE — 87389 HIV-1 AG W/HIV-1&-2 AB AG IA: CPT | Performed by: NURSE PRACTITIONER

## 2025-08-26 ASSESSMENT — ASTHMA QUESTIONNAIRES
QUESTION_4 LAST FOUR WEEKS HOW OFTEN HAVE YOU USED YOUR RESCUE INHALER OR NEBULIZER MEDICATION (SUCH AS ALBUTEROL): ONCE A WEEK OR LESS
QUESTION_3 LAST FOUR WEEKS HOW OFTEN DID YOUR ASTHMA SYMPTOMS (WHEEZING, COUGHING, SHORTNESS OF BREATH, CHEST TIGHTNESS OR PAIN) WAKE YOU UP AT NIGHT OR EARLIER THAN USUAL IN THE MORNING: NOT AT ALL
QUESTION_2 LAST FOUR WEEKS HOW OFTEN HAVE YOU HAD SHORTNESS OF BREATH: ONCE A DAY
QUESTION_5 LAST FOUR WEEKS HOW WOULD YOU RATE YOUR ASTHMA CONTROL: WELL CONTROLLED
ACT_TOTALSCORE: 20
QUESTION_1 LAST FOUR WEEKS HOW MUCH OF THE TIME DID YOUR ASTHMA KEEP YOU FROM GETTING AS MUCH DONE AT WORK, SCHOOL OR AT HOME: NONE OF THE TIME

## 2025-08-26 ASSESSMENT — PATIENT HEALTH QUESTIONNAIRE - PHQ9
10. IF YOU CHECKED OFF ANY PROBLEMS, HOW DIFFICULT HAVE THESE PROBLEMS MADE IT FOR YOU TO DO YOUR WORK, TAKE CARE OF THINGS AT HOME, OR GET ALONG WITH OTHER PEOPLE: SOMEWHAT DIFFICULT
SUM OF ALL RESPONSES TO PHQ QUESTIONS 1-9: 9
SUM OF ALL RESPONSES TO PHQ QUESTIONS 1-9: 9

## 2025-08-26 ASSESSMENT — PAIN SCALES - GENERAL: PAINLEVEL_OUTOF10: NO PAIN (0)

## 2025-08-27 LAB
C TRACH DNA SPEC QL PROBE+SIG AMP: NEGATIVE
HCV AB SERPL QL IA: NONREACTIVE
HIV 1+2 AB+HIV1 P24 AG SERPL QL IA: NONREACTIVE
N GONORRHOEA DNA SPEC QL NAA+PROBE: NEGATIVE
SPECIMEN TYPE: NORMAL
T PALLIDUM AB SER QL: NONREACTIVE

## 2025-08-28 ENCOUNTER — PATIENT OUTREACH (OUTPATIENT)
Dept: CARE COORDINATION | Facility: CLINIC | Age: 30
End: 2025-08-28
Payer: COMMERCIAL

## 2025-09-04 DIAGNOSIS — Z86.32 HISTORY OF GESTATIONAL DIABETES: ICD-10-CM

## 2025-09-04 DIAGNOSIS — E66.813 CLASS 3 SEVERE OBESITY WITHOUT SERIOUS COMORBIDITY WITH BODY MASS INDEX (BMI) OF 60.0 TO 69.9 IN ADULT, UNSPECIFIED OBESITY TYPE (H): Primary | ICD-10-CM

## 2025-09-04 RX ORDER — PHENTERMINE HYDROCHLORIDE 37.5 MG/1
TABLET ORAL
Qty: 45 TABLET | Refills: 0 | Status: SHIPPED | OUTPATIENT
Start: 2025-09-04 | End: 2025-12-03

## 2025-09-04 RX ORDER — TOPIRAMATE 25 MG/1
TABLET, FILM COATED ORAL
Qty: 180 TABLET | Refills: 0 | Status: SHIPPED | OUTPATIENT
Start: 2025-09-04